# Patient Record
Sex: MALE | URBAN - METROPOLITAN AREA
[De-identification: names, ages, dates, MRNs, and addresses within clinical notes are randomized per-mention and may not be internally consistent; named-entity substitution may affect disease eponyms.]

---

## 2018-01-12 PROBLEM — I25.10 3-VESSEL CAD: Status: ACTIVE | Noted: 2018-01-12

## 2018-01-13 ENCOUNTER — HOSPITAL ENCOUNTER (INPATIENT)
Facility: HOSPITAL | Age: 83
LOS: 3 days | Discharge: NURSING, REHAB OR LONG-TERM CARE | DRG: 215 | End: 2018-01-16
Attending: THORACIC SURGERY (CARDIOTHORACIC VASCULAR SURGERY) | Admitting: THORACIC SURGERY (CARDIOTHORACIC VASCULAR SURGERY)
Payer: MEDICARE

## 2018-01-13 ENCOUNTER — ANESTHESIA EVENT (OUTPATIENT)
Dept: MEDSURG UNIT | Facility: HOSPITAL | Age: 83
End: 2018-01-13

## 2018-01-13 DIAGNOSIS — G11.9 CEREBELLAR ATAXIA: ICD-10-CM

## 2018-01-13 DIAGNOSIS — I48.91 A-FIB: ICD-10-CM

## 2018-01-13 DIAGNOSIS — E78.5 DYSLIPIDEMIA: ICD-10-CM

## 2018-01-13 DIAGNOSIS — Z95.5 S/P DRUG ELUTING CORONARY STENT PLACEMENT: ICD-10-CM

## 2018-01-13 DIAGNOSIS — E11.59 TYPE 2 DIABETES MELLITUS WITH OTHER CIRCULATORY COMPLICATION, WITHOUT LONG-TERM CURRENT USE OF INSULIN: ICD-10-CM

## 2018-01-13 DIAGNOSIS — R07.9 CHEST PAIN: ICD-10-CM

## 2018-01-13 DIAGNOSIS — I20.0 UNSTABLE ANGINA: ICD-10-CM

## 2018-01-13 DIAGNOSIS — I21.3 ACUTE ST SEGMENT ELEVATION MI: ICD-10-CM

## 2018-01-13 DIAGNOSIS — I25.10 3-VESSEL CAD: ICD-10-CM

## 2018-01-13 DIAGNOSIS — I10 ESSENTIAL HYPERTENSION: ICD-10-CM

## 2018-01-13 DIAGNOSIS — I73.9 PERIPHERAL ARTERIAL DISEASE: ICD-10-CM

## 2018-01-13 DIAGNOSIS — I21.4 NSTEMI (NON-ST ELEVATED MYOCARDIAL INFARCTION): Primary | ICD-10-CM

## 2018-01-13 PROBLEM — E11.9 TYPE 2 DIABETES MELLITUS: Status: ACTIVE | Noted: 2018-01-13

## 2018-01-13 PROBLEM — F03.90 DEMENTIA: Status: ACTIVE | Noted: 2018-01-13

## 2018-01-13 LAB
ALBUMIN SERPL BCP-MCNC: 3.2 G/DL
ALBUMIN SERPL BCP-MCNC: 4 G/DL
ALP SERPL-CCNC: 52 U/L
ALP SERPL-CCNC: 73 U/L
ALT SERPL W/O P-5'-P-CCNC: 19 U/L
ALT SERPL W/O P-5'-P-CCNC: 39 U/L
ANION GAP SERPL CALC-SCNC: 10 MMOL/L
ANION GAP SERPL CALC-SCNC: 9 MMOL/L
APTT BLDCRRT: <21 SEC
AST SERPL-CCNC: 237 U/L
AST SERPL-CCNC: 45 U/L
BASOPHILS # BLD AUTO: 0.02 K/UL
BASOPHILS # BLD AUTO: 0.03 K/UL
BASOPHILS NFR BLD: 0.1 %
BASOPHILS NFR BLD: 0.2 %
BILIRUB SERPL-MCNC: 0.6 MG/DL
BILIRUB SERPL-MCNC: 0.7 MG/DL
BILIRUB UR QL STRIP: NEGATIVE
BNP SERPL-MCNC: 310 PG/ML
BUN SERPL-MCNC: 20 MG/DL
BUN SERPL-MCNC: 20 MG/DL
CALCIUM SERPL-MCNC: 8.4 MG/DL
CALCIUM SERPL-MCNC: 9.6 MG/DL
CHLORIDE SERPL-SCNC: 104 MMOL/L
CHLORIDE SERPL-SCNC: 106 MMOL/L
CHOLEST SERPL-MCNC: 159 MG/DL
CHOLEST/HDLC SERPL: 3.2 {RATIO}
CLARITY UR REFRACT.AUTO: CLEAR
CO2 SERPL-SCNC: 24 MMOL/L
CO2 SERPL-SCNC: 25 MMOL/L
COLOR UR AUTO: YELLOW
CORONARY STENOSIS: ABNORMAL
CORONARY STENT: YES
CREAT SERPL-MCNC: 0.8 MG/DL
CREAT SERPL-MCNC: 1 MG/DL
DIFFERENTIAL METHOD: ABNORMAL
DIFFERENTIAL METHOD: ABNORMAL
EOSINOPHIL # BLD AUTO: 0 K/UL
EOSINOPHIL # BLD AUTO: 0 K/UL
EOSINOPHIL NFR BLD: 0 %
EOSINOPHIL NFR BLD: 0 %
ERYTHROCYTE [DISTWIDTH] IN BLOOD BY AUTOMATED COUNT: 12.5 %
ERYTHROCYTE [DISTWIDTH] IN BLOOD BY AUTOMATED COUNT: 12.7 %
EST. GFR  (AFRICAN AMERICAN): >60 ML/MIN/1.73 M^2
EST. GFR  (AFRICAN AMERICAN): >60 ML/MIN/1.73 M^2
EST. GFR  (NON AFRICAN AMERICAN): >60 ML/MIN/1.73 M^2
EST. GFR  (NON AFRICAN AMERICAN): >60 ML/MIN/1.73 M^2
ESTIMATED AVG GLUCOSE: 120 MG/DL
GLUCOSE SERPL-MCNC: 139 MG/DL
GLUCOSE SERPL-MCNC: 199 MG/DL
GLUCOSE UR QL STRIP: NEGATIVE
HBA1C MFR BLD HPLC: 5.8 %
HCT VFR BLD AUTO: 39.5 %
HCT VFR BLD AUTO: 43.8 %
HDLC SERPL-MCNC: 50 MG/DL
HDLC SERPL: 31.4 %
HGB BLD-MCNC: 12.7 G/DL
HGB BLD-MCNC: 14.6 G/DL
HGB UR QL STRIP: NEGATIVE
IMM GRANULOCYTES # BLD AUTO: 0.13 K/UL
IMM GRANULOCYTES # BLD AUTO: 0.15 K/UL
IMM GRANULOCYTES NFR BLD AUTO: 0.7 %
IMM GRANULOCYTES NFR BLD AUTO: 0.7 %
INR PPP: 1
KETONES UR QL STRIP: ABNORMAL
LDLC SERPL CALC-MCNC: 94.8 MG/DL
LEUKOCYTE ESTERASE UR QL STRIP: NEGATIVE
LYMPHOCYTES # BLD AUTO: 0.7 K/UL
LYMPHOCYTES # BLD AUTO: 1.9 K/UL
LYMPHOCYTES NFR BLD: 10.4 %
LYMPHOCYTES NFR BLD: 3.5 %
MAGNESIUM SERPL-MCNC: 1.8 MG/DL
MAGNESIUM SERPL-MCNC: 2.4 MG/DL
MCH RBC QN AUTO: 28.8 PG
MCH RBC QN AUTO: 29.5 PG
MCHC RBC AUTO-ENTMCNC: 32.2 G/DL
MCHC RBC AUTO-ENTMCNC: 33.3 G/DL
MCV RBC AUTO: 89 FL
MCV RBC AUTO: 90 FL
MONOCYTES # BLD AUTO: 0.6 K/UL
MONOCYTES # BLD AUTO: 1.1 K/UL
MONOCYTES NFR BLD: 2.8 %
MONOCYTES NFR BLD: 6.1 %
NEUTROPHILS # BLD AUTO: 15.4 K/UL
NEUTROPHILS # BLD AUTO: 19.2 K/UL
NEUTROPHILS NFR BLD: 82.6 %
NEUTROPHILS NFR BLD: 92.9 %
NITRITE UR QL STRIP: NEGATIVE
NONHDLC SERPL-MCNC: 109 MG/DL
NRBC BLD-RTO: 0 /100 WBC
NRBC BLD-RTO: 0 /100 WBC
PH UR STRIP: 6 [PH] (ref 5–8)
PHOSPHATE SERPL-MCNC: 3.1 MG/DL
PHOSPHATE SERPL-MCNC: 3.4 MG/DL
PLATELET # BLD AUTO: 219 K/UL
PLATELET # BLD AUTO: 225 K/UL
PMV BLD AUTO: 9.6 FL
PMV BLD AUTO: 9.8 FL
POC ACTIVATED CLOTTING TIME K: 147 SEC (ref 74–137)
POTASSIUM SERPL-SCNC: 4.1 MMOL/L
POTASSIUM SERPL-SCNC: 4.1 MMOL/L
PROT SERPL-MCNC: 6.6 G/DL
PROT SERPL-MCNC: 8 G/DL
PROT UR QL STRIP: NEGATIVE
PROTHROMBIN TIME: 10.5 SEC
RBC # BLD AUTO: 4.41 M/UL
RBC # BLD AUTO: 4.95 M/UL
SAMPLE: ABNORMAL
SODIUM SERPL-SCNC: 139 MMOL/L
SODIUM SERPL-SCNC: 139 MMOL/L
SP GR UR STRIP: 1.02 (ref 1–1.03)
TRIGL SERPL-MCNC: 71 MG/DL
TROPONIN I SERPL DL<=0.01 NG/ML-MCNC: 1.83 NG/ML
URN SPEC COLLECT METH UR: ABNORMAL
UROBILINOGEN UR STRIP-ACNC: NEGATIVE EU/DL
WBC # BLD AUTO: 18.59 K/UL
WBC # BLD AUTO: 20.7 K/UL

## 2018-01-13 PROCEDURE — 85025 COMPLETE CBC W/AUTO DIFF WBC: CPT

## 2018-01-13 PROCEDURE — 36415 COLL VENOUS BLD VENIPUNCTURE: CPT

## 2018-01-13 PROCEDURE — 84100 ASSAY OF PHOSPHORUS: CPT | Mod: 91

## 2018-01-13 PROCEDURE — 25000003 PHARM REV CODE 250

## 2018-01-13 PROCEDURE — 27000014 CATH LAB PROCEDURE

## 2018-01-13 PROCEDURE — 25000003 PHARM REV CODE 250: Performed by: INTERNAL MEDICINE

## 2018-01-13 PROCEDURE — 63600175 PHARM REV CODE 636 W HCPCS: Performed by: INTERNAL MEDICINE

## 2018-01-13 PROCEDURE — 25000003 PHARM REV CODE 250: Performed by: STUDENT IN AN ORGANIZED HEALTH CARE EDUCATION/TRAINING PROGRAM

## 2018-01-13 PROCEDURE — 92933 PRQ TRLML C ATHRC ST ANGIOP1: CPT | Mod: RC,GC,, | Performed by: INTERNAL MEDICINE

## 2018-01-13 PROCEDURE — 81003 URINALYSIS AUTO W/O SCOPE: CPT

## 2018-01-13 PROCEDURE — 4A023N7 MEASUREMENT OF CARDIAC SAMPLING AND PRESSURE, LEFT HEART, PERCUTANEOUS APPROACH: ICD-10-PCS | Performed by: INTERNAL MEDICINE

## 2018-01-13 PROCEDURE — 83880 ASSAY OF NATRIURETIC PEPTIDE: CPT

## 2018-01-13 PROCEDURE — B2111ZZ FLUOROSCOPY OF MULTIPLE CORONARY ARTERIES USING LOW OSMOLAR CONTRAST: ICD-10-PCS | Performed by: INTERNAL MEDICINE

## 2018-01-13 PROCEDURE — 83735 ASSAY OF MAGNESIUM: CPT | Mod: 91

## 2018-01-13 PROCEDURE — 63600175 PHARM REV CODE 636 W HCPCS: Performed by: STUDENT IN AN ORGANIZED HEALTH CARE EDUCATION/TRAINING PROGRAM

## 2018-01-13 PROCEDURE — 02C03Z6 EXTIRPATION OF MATTER FROM CORONARY ARTERY, ONE ARTERY, BIFURCATION, PERCUTANEOUS APPROACH: ICD-10-PCS | Performed by: INTERNAL MEDICINE

## 2018-01-13 PROCEDURE — 99152 MOD SED SAME PHYS/QHP 5/>YRS: CPT | Mod: ,,, | Performed by: INTERNAL MEDICINE

## 2018-01-13 PROCEDURE — 93458 L HRT ARTERY/VENTRICLE ANGIO: CPT | Mod: 26,59,GC, | Performed by: INTERNAL MEDICINE

## 2018-01-13 PROCEDURE — 93005 ELECTROCARDIOGRAM TRACING: CPT

## 2018-01-13 PROCEDURE — 02HA3RJ INSERTION OF SHORT-TERM EXTERNAL HEART ASSIST SYSTEM INTO HEART, INTRAOPERATIVE, PERCUTANEOUS APPROACH: ICD-10-PCS | Performed by: INTERNAL MEDICINE

## 2018-01-13 PROCEDURE — 92928 PRQ TCAT PLMT NTRAC ST 1 LES: CPT | Mod: 59,LC,GC, | Performed by: INTERNAL MEDICINE

## 2018-01-13 PROCEDURE — 85610 PROTHROMBIN TIME: CPT

## 2018-01-13 PROCEDURE — B2151ZZ FLUOROSCOPY OF LEFT HEART USING LOW OSMOLAR CONTRAST: ICD-10-PCS | Performed by: INTERNAL MEDICINE

## 2018-01-13 PROCEDURE — 92979 ENDOLUMINL IVUS OCT C EA: CPT | Mod: 26,GC,, | Performed by: INTERNAL MEDICINE

## 2018-01-13 PROCEDURE — 5A0221D ASSISTANCE WITH CARDIAC OUTPUT USING IMPELLER PUMP, CONTINUOUS: ICD-10-PCS | Performed by: INTERNAL MEDICINE

## 2018-01-13 PROCEDURE — 27000426 HC IMPELLA SET UP

## 2018-01-13 PROCEDURE — 85730 THROMBOPLASTIN TIME PARTIAL: CPT

## 2018-01-13 PROCEDURE — 84484 ASSAY OF TROPONIN QUANT: CPT

## 2018-01-13 PROCEDURE — 63600175 PHARM REV CODE 636 W HCPCS

## 2018-01-13 PROCEDURE — 83735 ASSAY OF MAGNESIUM: CPT

## 2018-01-13 PROCEDURE — B240ZZ3 ULTRASONOGRAPHY OF SINGLE CORONARY ARTERY, INTRAVASCULAR: ICD-10-PCS | Performed by: INTERNAL MEDICINE

## 2018-01-13 PROCEDURE — 27000221 HC OXYGEN, UP TO 24 HOURS

## 2018-01-13 PROCEDURE — 20000000 HC ICU ROOM

## 2018-01-13 PROCEDURE — 27000203 HC IMPELLA ADD'L DAY (CL)

## 2018-01-13 PROCEDURE — 92978 ENDOLUMINL IVUS OCT C 1ST: CPT

## 2018-01-13 PROCEDURE — 99233 SBSQ HOSP IP/OBS HIGH 50: CPT | Mod: GC,,, | Performed by: INTERNAL MEDICINE

## 2018-01-13 PROCEDURE — 80061 LIPID PANEL: CPT

## 2018-01-13 PROCEDURE — 80053 COMPREHEN METABOLIC PANEL: CPT

## 2018-01-13 PROCEDURE — 93010 ELECTROCARDIOGRAM REPORT: CPT | Mod: ,,, | Performed by: INTERNAL MEDICINE

## 2018-01-13 PROCEDURE — 63600175 PHARM REV CODE 636 W HCPCS: Performed by: THORACIC SURGERY (CARDIOTHORACIC VASCULAR SURGERY)

## 2018-01-13 PROCEDURE — 92978 ENDOLUMINL IVUS OCT C 1ST: CPT | Mod: 26,GC,, | Performed by: INTERNAL MEDICINE

## 2018-01-13 PROCEDURE — 25000003 PHARM REV CODE 250: Performed by: THORACIC SURGERY (CARDIOTHORACIC VASCULAR SURGERY)

## 2018-01-13 PROCEDURE — 5A1213Z PERFORMANCE OF CARDIAC PACING, INTERMITTENT: ICD-10-PCS | Performed by: INTERNAL MEDICINE

## 2018-01-13 PROCEDURE — 83036 HEMOGLOBIN GLYCOSYLATED A1C: CPT

## 2018-01-13 PROCEDURE — 80053 COMPREHEN METABOLIC PANEL: CPT | Mod: 91

## 2018-01-13 PROCEDURE — 33990 INSJ PERQ VAD L HRT ARTERIAL: CPT | Mod: GC,,, | Performed by: INTERNAL MEDICINE

## 2018-01-13 PROCEDURE — 0273376 DILATION OF CORONARY ARTERY, FOUR OR MORE ARTERIES, BIFURCATION, WITH FOUR OR MORE DRUG-ELUTING INTRALUMINAL DEVICES, PERCUTANEOUS APPROACH: ICD-10-PCS | Performed by: INTERNAL MEDICINE

## 2018-01-13 PROCEDURE — 84100 ASSAY OF PHOSPHORUS: CPT

## 2018-01-13 RX ORDER — FAMOTIDINE 10 MG/ML
20 INJECTION INTRAVENOUS EVERY 12 HOURS
Status: DISCONTINUED | OUTPATIENT
Start: 2018-01-13 | End: 2018-01-13

## 2018-01-13 RX ORDER — LUBIPROSTONE 24 UG/1
24 CAPSULE ORAL 2 TIMES DAILY WITH MEALS
COMMUNITY

## 2018-01-13 RX ORDER — ROSUVASTATIN CALCIUM 5 MG/1
10 TABLET, COATED ORAL NIGHTLY
Status: DISCONTINUED | OUTPATIENT
Start: 2018-01-13 | End: 2018-01-13

## 2018-01-13 RX ORDER — ROSUVASTATIN CALCIUM 10 MG/1
10 TABLET, COATED ORAL DAILY
COMMUNITY

## 2018-01-13 RX ORDER — LISINOPRIL 20 MG/1
20 TABLET ORAL DAILY
COMMUNITY

## 2018-01-13 RX ORDER — CARVEDILOL 3.12 MG/1
3.12 TABLET ORAL 2 TIMES DAILY WITH MEALS
Status: ON HOLD | COMMUNITY
End: 2018-01-16 | Stop reason: HOSPADM

## 2018-01-13 RX ORDER — MORPHINE SULFATE 2 MG/ML
2 INJECTION, SOLUTION INTRAMUSCULAR; INTRAVENOUS ONCE
Status: DISCONTINUED | OUTPATIENT
Start: 2018-01-13 | End: 2018-01-13

## 2018-01-13 RX ORDER — SODIUM CHLORIDE 9 MG/ML
INJECTION, SOLUTION INTRAVENOUS CONTINUOUS
Status: ACTIVE | OUTPATIENT
Start: 2018-01-13 | End: 2018-01-14

## 2018-01-13 RX ORDER — DRONABINOL 2.5 MG/1
2.5 CAPSULE ORAL 2 TIMES DAILY
Status: DISCONTINUED | OUTPATIENT
Start: 2018-01-13 | End: 2018-01-16 | Stop reason: HOSPADM

## 2018-01-13 RX ORDER — ISOSORBIDE MONONITRATE 30 MG/1
30 TABLET, EXTENDED RELEASE ORAL DAILY
Status: ON HOLD | COMMUNITY
End: 2018-01-16 | Stop reason: HOSPADM

## 2018-01-13 RX ORDER — ASPIRIN 81 MG/1
81 TABLET ORAL DAILY
Status: DISCONTINUED | OUTPATIENT
Start: 2018-01-13 | End: 2018-01-16 | Stop reason: HOSPADM

## 2018-01-13 RX ORDER — SODIUM CHLORIDE 0.9 % (FLUSH) 0.9 %
3 SYRINGE (ML) INJECTION
Status: DISCONTINUED | OUTPATIENT
Start: 2018-01-13 | End: 2018-01-16 | Stop reason: HOSPADM

## 2018-01-13 RX ORDER — NITROGLYCERIN 0.4 MG/1
0.4 TABLET SUBLINGUAL EVERY 5 MIN PRN
Status: DISCONTINUED | OUTPATIENT
Start: 2018-01-13 | End: 2018-01-16 | Stop reason: HOSPADM

## 2018-01-13 RX ORDER — SODIUM CHLORIDE 9 MG/ML
INJECTION, SOLUTION INTRAVENOUS CONTINUOUS
Status: CANCELLED | OUTPATIENT
Start: 2018-01-13

## 2018-01-13 RX ORDER — GLIMEPIRIDE 2 MG/1
2 TABLET ORAL
COMMUNITY

## 2018-01-13 RX ORDER — ONDANSETRON 2 MG/ML
4 INJECTION INTRAMUSCULAR; INTRAVENOUS EVERY 6 HOURS PRN
Status: DISCONTINUED | OUTPATIENT
Start: 2018-01-13 | End: 2018-01-16 | Stop reason: HOSPADM

## 2018-01-13 RX ORDER — ONDANSETRON 2 MG/ML
INJECTION INTRAMUSCULAR; INTRAVENOUS
Status: COMPLETED
Start: 2018-01-13 | End: 2018-01-13

## 2018-01-13 RX ORDER — HYDRALAZINE HYDROCHLORIDE 20 MG/ML
10 INJECTION INTRAMUSCULAR; INTRAVENOUS EVERY 4 HOURS PRN
Status: DISCONTINUED | OUTPATIENT
Start: 2018-01-13 | End: 2018-01-16 | Stop reason: HOSPADM

## 2018-01-13 RX ORDER — MORPHINE SULFATE 2 MG/ML
2 INJECTION, SOLUTION INTRAMUSCULAR; INTRAVENOUS EVERY 4 HOURS PRN
Status: DISCONTINUED | OUTPATIENT
Start: 2018-01-13 | End: 2018-01-16 | Stop reason: HOSPADM

## 2018-01-13 RX ORDER — HYDRALAZINE HYDROCHLORIDE 20 MG/ML
10 INJECTION INTRAMUSCULAR; INTRAVENOUS ONCE
Status: COMPLETED | OUTPATIENT
Start: 2018-01-13 | End: 2018-01-13

## 2018-01-13 RX ORDER — ASPIRIN 81 MG/1
81 TABLET ORAL DAILY
COMMUNITY

## 2018-01-13 RX ORDER — PYRIDOXINE HCL (VITAMIN B6) 100 MG
100 TABLET ORAL DAILY
COMMUNITY

## 2018-01-13 RX ORDER — NITROGLYCERIN 20 MG/100ML
10 INJECTION INTRAVENOUS CONTINUOUS
Status: DISCONTINUED | OUTPATIENT
Start: 2018-01-13 | End: 2018-01-14

## 2018-01-13 RX ORDER — DRONABINOL 2.5 MG/1
2.5 CAPSULE ORAL
Status: ON HOLD | COMMUNITY
End: 2018-01-16

## 2018-01-13 RX ORDER — METOPROLOL TARTRATE 1 MG/ML
5 INJECTION, SOLUTION INTRAVENOUS ONCE
Status: COMPLETED | OUTPATIENT
Start: 2018-01-13 | End: 2018-01-13

## 2018-01-13 RX ORDER — AMLODIPINE BESYLATE 5 MG/1
5 TABLET ORAL DAILY
Status: ON HOLD | COMMUNITY
End: 2018-01-16 | Stop reason: HOSPADM

## 2018-01-13 RX ORDER — NITROGLYCERIN 0.4 MG/1
0.4 TABLET SUBLINGUAL EVERY 5 MIN PRN
COMMUNITY

## 2018-01-13 RX ORDER — ROSUVASTATIN CALCIUM 20 MG/1
20 TABLET, COATED ORAL NIGHTLY
Status: DISCONTINUED | OUTPATIENT
Start: 2018-01-13 | End: 2018-01-16 | Stop reason: HOSPADM

## 2018-01-13 RX ORDER — GLUCAGON 1 MG
1 KIT INJECTION
Status: DISCONTINUED | OUTPATIENT
Start: 2018-01-13 | End: 2018-01-16 | Stop reason: HOSPADM

## 2018-01-13 RX ORDER — POLYETHYLENE GLYCOL 3350 17 G/17G
17 POWDER, FOR SOLUTION ORAL DAILY
Status: DISCONTINUED | OUTPATIENT
Start: 2018-01-13 | End: 2018-01-16 | Stop reason: HOSPADM

## 2018-01-13 RX ORDER — METOPROLOL TARTRATE 1 MG/ML
INJECTION, SOLUTION INTRAVENOUS
Status: COMPLETED
Start: 2018-01-13 | End: 2018-01-13

## 2018-01-13 RX ORDER — PANTOPRAZOLE SODIUM 40 MG/1
40 TABLET, DELAYED RELEASE ORAL DAILY
COMMUNITY

## 2018-01-13 RX ORDER — ACETAMINOPHEN 325 MG/1
650 TABLET ORAL EVERY 4 HOURS PRN
Status: DISCONTINUED | OUTPATIENT
Start: 2018-01-13 | End: 2018-01-16 | Stop reason: HOSPADM

## 2018-01-13 RX ORDER — METHYLPREDNISOLONE SOD SUCC 125 MG
125 VIAL (EA) INJECTION EVERY 6 HOURS
Status: DISCONTINUED | OUTPATIENT
Start: 2018-01-13 | End: 2018-01-13

## 2018-01-13 RX ORDER — ONDANSETRON 4 MG/1
4 TABLET, ORALLY DISINTEGRATING ORAL EVERY 6 HOURS PRN
Status: DISCONTINUED | OUTPATIENT
Start: 2018-01-13 | End: 2018-01-16 | Stop reason: HOSPADM

## 2018-01-13 RX ORDER — CARVEDILOL 3.12 MG/1
3.12 TABLET ORAL 2 TIMES DAILY
Status: DISCONTINUED | OUTPATIENT
Start: 2018-01-13 | End: 2018-01-14

## 2018-01-13 RX ORDER — LISINOPRIL 20 MG/1
20 TABLET ORAL DAILY
Status: DISCONTINUED | OUTPATIENT
Start: 2018-01-13 | End: 2018-01-16 | Stop reason: HOSPADM

## 2018-01-13 RX ORDER — DIPHENHYDRAMINE HYDROCHLORIDE 50 MG/ML
50 INJECTION INTRAMUSCULAR; INTRAVENOUS ONCE
Status: COMPLETED | OUTPATIENT
Start: 2018-01-13 | End: 2018-01-13

## 2018-01-13 RX ORDER — NITROGLYCERIN 0.4 MG/1
TABLET SUBLINGUAL
Status: DISPENSED
Start: 2018-01-13 | End: 2018-01-13

## 2018-01-13 RX ORDER — PNV NO.95/FERROUS FUM/FOLIC AC 28MG-0.8MG
1000 TABLET ORAL DAILY
COMMUNITY

## 2018-01-13 RX ORDER — INSULIN ASPART 100 [IU]/ML
1-10 INJECTION, SOLUTION INTRAVENOUS; SUBCUTANEOUS EVERY 6 HOURS PRN
Status: DISCONTINUED | OUTPATIENT
Start: 2018-01-13 | End: 2018-01-16 | Stop reason: HOSPADM

## 2018-01-13 RX ORDER — AMLODIPINE BESYLATE 5 MG/1
5 TABLET ORAL DAILY
Status: DISCONTINUED | OUTPATIENT
Start: 2018-01-14 | End: 2018-01-15

## 2018-01-13 RX ADMIN — POLYETHYLENE GLYCOL 3350 17 G: 17 POWDER, FOR SOLUTION ORAL at 10:01

## 2018-01-13 RX ADMIN — CARVEDILOL 3.12 MG: 3.12 TABLET, FILM COATED ORAL at 11:01

## 2018-01-13 RX ADMIN — LISINOPRIL 20 MG: 20 TABLET ORAL at 11:01

## 2018-01-13 RX ADMIN — DRONABINOL 2.5 MG: 2.5 CAPSULE ORAL at 11:01

## 2018-01-13 RX ADMIN — ONDANSETRON 4 MG: 2 INJECTION INTRAMUSCULAR; INTRAVENOUS at 10:01

## 2018-01-13 RX ADMIN — ASPIRIN 81 MG: 81 TABLET, COATED ORAL at 09:01

## 2018-01-13 RX ADMIN — ONDANSETRON 4 MG: 4 TABLET, ORALLY DISINTEGRATING ORAL at 09:01

## 2018-01-13 RX ADMIN — SODIUM CHLORIDE 300 ML/HR: 0.9 INJECTION, SOLUTION INTRAVENOUS at 08:01

## 2018-01-13 RX ADMIN — MORPHINE SULFATE 2 MG: 2 INJECTION, SOLUTION INTRAMUSCULAR; INTRAVENOUS at 10:01

## 2018-01-13 RX ADMIN — NITROGLYCERIN 0.4 MG: 0.4 TABLET SUBLINGUAL at 10:01

## 2018-01-13 RX ADMIN — TICAGRELOR 180 MG: 90 TABLET ORAL at 10:01

## 2018-01-13 RX ADMIN — ONDANSETRON 4 MG: 2 INJECTION INTRAMUSCULAR; INTRAVENOUS at 12:01

## 2018-01-13 RX ADMIN — NITROGLYCERIN 10 MCG/MIN: 20 INJECTION INTRAVENOUS at 11:01

## 2018-01-13 RX ADMIN — DIPHENHYDRAMINE HYDROCHLORIDE 25 MG: 50 INJECTION, SOLUTION INTRAMUSCULAR; INTRAVENOUS at 12:01

## 2018-01-13 RX ADMIN — ROSUVASTATIN CALCIUM 20 MG: 20 TABLET, FILM COATED ORAL at 11:01

## 2018-01-13 RX ADMIN — METOPROLOL TARTRATE 5 MG: 1 INJECTION, SOLUTION INTRAVENOUS at 12:01

## 2018-01-13 RX ADMIN — METHYLPREDNISOLONE SODIUM SUCCINATE 125 MG: 125 INJECTION, POWDER, FOR SOLUTION INTRAMUSCULAR; INTRAVENOUS at 01:01

## 2018-01-13 RX ADMIN — METOPROLOL TARTRATE 5 MG: 5 INJECTION, SOLUTION INTRAVENOUS at 12:01

## 2018-01-13 RX ADMIN — DRONABINOL 2.5 MG: 2.5 CAPSULE ORAL at 10:01

## 2018-01-13 RX ADMIN — HYDRALAZINE HYDROCHLORIDE 10 MG: 20 INJECTION INTRAMUSCULAR; INTRAVENOUS at 09:01

## 2018-01-13 NOTE — HPI
Pt is a 83 y/o male w hx of mild dementia, hypertension, hyperlipidemia, severe three vessel CAD with prior PCTA in [place], HTN, HLD, T2DM, BPH, carotid artery Stenosis, and TIA who presented to OSH with unstable angina despite beta blocker, nitrate, and 4 baby aspirin. States CP lasted 10-15 min, radiated down arm. SOB week prior to episode. Woke up patient from sleep. While there had a LHC with  distal LMCA 90%, ostial LAD 90%, ostial LCx 90%, mid RCA 95%, and EF 55%. He was initially on nitro gtt there but on transfer that was stopped and he was on the floor here. Today he had chest pain that was refractory to sublingual nitro so cardiology was consulted. On evaluation of the patient EKG with diffuse ST depressions in inferior, anterior, and lateral leads. He was transferred to the ICU and started on nitroglycerin gtt. Which helped a bit with the chest pain but did not relieve it all the way.     Dx with stable angina 2 weeks prior, started on beta- blockade. Prior LHC in 2002 showed severe diffuse CAD with  of RCA collateralized and preserved LV fxn. Patient interactive, issues with short term memory. Able to care perform ADL's.      Echo 1/12/17: disc did not work but states EF is 75%     Functionally: shuffling walk, able to walk 1/2 mile per daughter with a cane

## 2018-01-13 NOTE — SUBJECTIVE & OBJECTIVE
No current facility-administered medications on file prior to encounter.      No current outpatient prescriptions on file prior to encounter.       Review of patient's allergies indicates:   Allergen Reactions    Iodine and iodide containing products Anaphylaxis     Past Medical History:   Diagnosis Date    3-vessel CAD     Alzheimer disease     Ataxia     BPH (benign prostatic hyperplasia)     Carotid stenosis     Cerebellar ataxia     Chronic nausea     HLD (hyperlipidemia)     HTN (hypertension)     TIA (transient ischemic attack)     Type 2 diabetes mellitus      Past Surgical History:   Procedure Laterality Date    APPENDECTOMY      CARDIAC CATHETERIZATION      malignant melanoma        Family History     None        Social History Main Topics    Smoking status: Former Smoker    Smokeless tobacco: Not on file    Alcohol use Yes      Comment: wine    Drug use: No    Sexual activity: Not on file     Review of Systems   Constitutional: Negative for activity change.   HENT: Negative for congestion.    Respiratory: Negative for cough, shortness of breath and wheezing.    Cardiovascular: Negative for chest pain and palpitations.   Gastrointestinal: Negative for abdominal distention and abdominal pain.   Musculoskeletal: Positive for arthralgias.   All other systems reviewed and are negative.    Objective:     Vital Signs (Most Recent):  Temp: 98.2 °F (36.8 °C) (01/13/18 0830)  Pulse: 63 (01/13/18 0700)  Resp: 18 (01/13/18 0830)  BP: (!) 174/70 (01/13/18 0830)  SpO2: (!) 91 % (01/13/18 0830) Vital Signs (24h Range):  Temp:  [98.2 °F (36.8 °C)-98.4 °F (36.9 °C)] 98.2 °F (36.8 °C)  Pulse:  [63-74] 63  Resp:  [18-20] 18  SpO2:  [91 %-94 %] 91 %  BP: (172-174)/(70-77) 174/70     Weight: 96.8 kg (213 lb 8 oz)  Body mass index is 28.17 kg/m².    SpO2: (!) 91 %  O2 Device (Oxygen Therapy): room air     Intake/Output - Last 3 Shifts     None           Lines/Drains/Airways          No matching active lines,  drains, or airways        STS Risk Score:       Physical Exam   Constitutional: He is oriented to person, place, and time. He appears well-developed and well-nourished. No distress.   HENT:   Head: Normocephalic and atraumatic.   Eyes: No scleral icterus.   Cardiovascular: Normal rate and regular rhythm.    hypertensive   Pulmonary/Chest: Effort normal. No stridor. No respiratory distress.   Abdominal: Soft. He exhibits no distension. There is no tenderness.   Musculoskeletal: He exhibits no edema.   Short, shuffling gait   Neurological: He is alert and oriented to person, place, and time.   Issues with short term memory   Nursing note and vitals reviewed.    Significant Labs:  Cardiac markers: No results for input(s): CKMB, CPKMB, TROPONINT, TROPONINI, MYOGLOBIN in the last 48 hours.  CBC: No results for input(s): WBC, RBC, HGB, HCT, PLT, MCV, MCH, MCHC in the last 48 hours.  CMP: No results for input(s): GLU, CALCIUM, ALBUMIN, PROT, NA, K, CO2, CL, BUN, CREATININE, ALKPHOS, ALT, AST, BILITOT in the last 48 hours.  Coagulation: No results for input(s): PT, INR, APTT in the last 48 hours.  Lactic Acid: No results for input(s): LACTATE in the last 48 hours.  All pertinent labs from the last 24 hours have been reviewed.    Significant Diagnostics:  I have reviewed and interpreted all pertinent imaging results/findings within the past 24 hours.

## 2018-01-13 NOTE — ASSESSMENT & PLAN NOTE
81 y/o male with multiple comorbidities including dementia presents for evaluation of urgent CABG for 3 vessel CAD, admitted to OSH with unstable angina. Stable now without CP.     - admit to CTS, orders placed  - home meds started  - review remaining home meds   - will discuss and review imaging with Dr. Mercedes

## 2018-01-13 NOTE — PROGRESS NOTES
Pt arrived to ICU91 from CSU per RN, on 3LNC, connected to bedside tele monitor. Dr. Miller and Dr. Hannah aware of arrival. All VSS. New orders received and carried out. See flowsheet for full assessment. Will continue to monitor.

## 2018-01-13 NOTE — SUBJECTIVE & OBJECTIVE
Past Medical History:   Diagnosis Date    3-vessel CAD     Alzheimer disease     Ataxia     BPH (benign prostatic hyperplasia)     Carotid stenosis     Cerebellar ataxia     Chronic nausea     HLD (hyperlipidemia)     HTN (hypertension)     TIA (transient ischemic attack)     Type 2 diabetes mellitus        Past Surgical History:   Procedure Laterality Date    APPENDECTOMY      CARDIAC CATHETERIZATION      malignant melanoma          Review of patient's allergies indicates:   Allergen Reactions    Iodine and iodide containing products Anaphylaxis       No current facility-administered medications on file prior to encounter.      No current outpatient prescriptions on file prior to encounter.     Family History     None        Social History Main Topics    Smoking status: Former Smoker    Smokeless tobacco: Not on file    Alcohol use Yes      Comment: wine    Drug use: No    Sexual activity: Not on file     Review of Systems   All other systems reviewed and are negative.    Objective:     Vital Signs (Most Recent):  Temp: 98.6 °F (37 °C) (01/13/18 1200)  Pulse: (!) 59 (01/13/18 1245)  Resp: (!) 21 (01/13/18 1245)  BP: 116/62 (01/13/18 1245)  SpO2: 98 % (01/13/18 1245) Vital Signs (24h Range):  Temp:  [98.2 °F (36.8 °C)-98.6 °F (37 °C)] 98.6 °F (37 °C)  Pulse:  [58-97] 59  Resp:  [12-25] 21  SpO2:  [91 %-99 %] 98 %  BP: (112-174)/(57-82) 116/62     Weight: 96.8 kg (213 lb 8 oz)  Body mass index is 28.17 kg/m².    SpO2: 98 %  O2 Device (Oxygen Therapy): room air      Intake/Output Summary (Last 24 hours) at 01/13/18 1316  Last data filed at 01/13/18 1200   Gross per 24 hour   Intake                0 ml   Output              375 ml   Net             -375 ml       Lines/Drains/Airways     Drain                 Suprapubic Catheter 01/13/13 1826 days                Physical Exam   Constitutional: He appears well-developed and well-nourished.   HENT:   Head: Normocephalic and atraumatic.   Neck: Normal  range of motion. Neck supple. No JVD present.   Cardiovascular: Normal rate, regular rhythm and normal heart sounds.  Exam reveals no gallop and no friction rub.    No murmur heard.  Pulses:       Femoral pulses are 2+ on the right side, and 2+ on the left side.  Pulmonary/Chest: Effort normal and breath sounds normal. He has no rales.   Abdominal: Soft. Bowel sounds are normal.   Musculoskeletal: Normal range of motion. He exhibits no edema.   Neurological: He is alert.   Skin: Skin is warm and dry.       Significant Labs:   Recent Lab Results       01/13/18  0915      Immature Granulocytes 0.7(H)     Immature Grans (Abs) 0.13(H)     Albumin 4.0     Alkaline Phosphatase 73     ALT 19     Anion Gap 10     aPTT <21.0  Comment:  aPTT therapeutic range = 39-69 seconds     AST 45(H)     Baso # 0.03     Basophil% 0.2     Total Bilirubin 0.6  Comment:  For infants and newborns, interpretation of results should be based  on gestational age, weight and in agreement with clinical  observations.  Premature Infant recommended reference ranges:  Up to 24 hours.............<8.0 mg/dL  Up to 48 hours............<12.0 mg/dL  3-5 days..................<15.0 mg/dL  6-29 days.................<15.0 mg/dL       BUN, Bld 20     Calcium 9.6     Chloride 104     CO2 25     Creatinine 1.0     Differential Method Automated     eGFR if African American >60.0     eGFR if non  >60.0  Comment:  Calculation used to obtain the estimated glomerular filtration  rate (eGFR) is the CKD-EPI equation.        Eos # 0.0     Eosinophil% 0.0     Glucose 139(H)     Gran # 15.4(H)     Gran% 82.6(H)     Hematocrit 43.8     Hemoglobin 14.6     Coumadin Monitoring INR 1.0  Comment:  Coumadin Therapy:  2.0 - 3.0 for INR for all indicators except mechanical heart valves  and antiphospholipid syndromes which should use 2.5 - 3.5.       Lymph # 1.9     Lymph% 10.4(L)     Magnesium 2.4     MCH 29.5     MCHC 33.3     MCV 89     Mono # 1.1(H)     Mono%  6.1     MPV 9.8     nRBC 0     Phosphorus 3.1     Platelets 219     Potassium 4.1     Total Protein 8.0     Protime 10.5     RBC 4.95     RDW 12.5     Sodium 139     WBC 18.59(H)           Significant Imaging: EKG: Diffuse ST depressions in inf, ant, lat leads

## 2018-01-13 NOTE — PROGRESS NOTES
Dr. Brown informed pt only able to tolerate 25mg of IVP benadryl, started to mildly hallucinate. Will continue to monitor.

## 2018-01-13 NOTE — HPI
83 y/o male c pmhc of severe three vessel CAD with prior PCA in [place], HTN, HLD, T2DM, BPH, alzheimer's dementia, carotid aa. Stenosis, and TIA who presented to OSH with unstable angina despite beta blocker, nitrate, and 4 baby aspirin.     States CP lasted 10-15 min, radiated down arm. SOB week prior to episode. Woke up patient from sleep.   Dx with stable angina 2 weeks prior, started on beta- blockade. Prior LHC in 2002 showed severe diffuse CAD with  of RCA collateralized and preserved LV fxn. Patient interactive, issues with short term memory. Able to care perform ADL's.     LHC at [facility] 1/11/18 showed:   - distal LMCA 90%   - ostial LAD 90%  - ostial LCx 90%  - mid RCA 95%  - EF 55%    Echo 1/12/17: disc, not report     Functionally: shuffling walk, able to walk 1/2 mile per daughter with a cane

## 2018-01-13 NOTE — HPI
Chief Complaint/Reason for Admission: unstable angina     History of Present Illness: 81 y/o male c pmhc of severe three vessel CAD with prior PCA in [place], HTN, HLD, T2DM, BPH, alzheimer's dementia, carotid aa. Stenosis, and TIA who presented to OSH with unstable angina despite beta blocker, nitrate, and 4 baby aspirin.     Initially Dx with stable angina 2 weeks prior, started on beta- blockade. Prior LHC in 2002 showed severe diffuse CAD with  of RCA collateralized and preserved LV fxn. Patient interactive, issues with short term memory. Able to care perform ADL's.

## 2018-01-13 NOTE — CONSULTS
[facility]  Cardiology  Consult Note    Patient Name: Flo De La Torre  MRN: [id number]  Admission Date: 1/13/2018  Hospital Length of Stay: 0 days  Code Status: Full Code   Attending Provider: Flo Mercedes MD   Consulting Provider: Luis Miller MD  Primary Care Physician: Primary Doctor No  Principal Problem:3-vessel CAD    Patient information was obtained from notes sent, family and patient and ER records.     Inpatient consult to Cardiology  Consult performed by: LUIS MILLER  Consult ordered by: JOSIANE SANDOVAL  Reason for consult: unstable angina        Subjective:     Chief Complaint:  Chest pain    HPI:   Pt is a 83 y/o male w hx of mild dementia, hypertension, hyperlipidemia, severe three vessel CAD with prior PCTA in [place], HTN, HLD, T2DM, BPH, carotid artery Stenosis, and TIA who presented to OSH with unstable angina despite beta blocker, nitrate, and 4 baby aspirin. States CP lasted 10-15 min, radiated down arm. SOB week prior to episode. Woke up patient from sleep. While there had a LHC with  distal LMCA 90%, ostial LAD 90%, ostial LCx 90%, mid RCA 95%, and EF 55%. He was initially on nitro gtt there but on transfer that was stopped and he was on the floor here. Today he had chest pain that was refractory to sublingual nitro so cardiology was consulted. On evaluation of the patient EKG with diffuse ST depressions in inferior, anterior, and lateral leads. He was transferred to the ICU and started on nitroglycerin gtt. Which helped a bit with the chest pain but did not relieve it all the way.     Dx with stable angina 2 weeks prior, started on beta- blockade. Prior LHC in 2002 showed severe diffuse CAD with  of RCA collateralized and preserved LV fxn. Patient interactive, issues with short term memory. Able to care perform ADL's.      Echo 1/12/17: disc did not work but states EF is 75%     Functionally: shuffling walk, able to walk 1/2 mile per daughter with a  cane    Past Medical History:   Diagnosis Date    3-vessel CAD     Alzheimer disease     Ataxia     BPH (benign prostatic hyperplasia)     Carotid stenosis     Cerebellar ataxia     Chronic nausea     HLD (hyperlipidemia)     HTN (hypertension)     TIA (transient ischemic attack)     Type 2 diabetes mellitus        Past Surgical History:   Procedure Laterality Date    APPENDECTOMY      CARDIAC CATHETERIZATION      malignant melanoma          Review of patient's allergies indicates:   Allergen Reactions    Iodine and iodide containing products Anaphylaxis       No current facility-administered medications on file prior to encounter.      No current outpatient prescriptions on file prior to encounter.     Family History     None        Social History Main Topics    Smoking status: Former Smoker    Smokeless tobacco: Not on file    Alcohol use Yes      Comment: wine    Drug use: No    Sexual activity: Not on file     Review of Systems   All other systems reviewed and are negative.    Objective:     Vital Signs (Most Recent):  Temp: 98.6 °F (37 °C) (01/13/18 1200)  Pulse: (!) 59 (01/13/18 1245)  Resp: (!) 21 (01/13/18 1245)  BP: 116/62 (01/13/18 1245)  SpO2: 98 % (01/13/18 1245) Vital Signs (24h Range):  Temp:  [98.2 °F (36.8 °C)-98.6 °F (37 °C)] 98.6 °F (37 °C)  Pulse:  [58-97] 59  Resp:  [12-25] 21  SpO2:  [91 %-99 %] 98 %  BP: (112-174)/(57-82) 116/62     Weight: 96.8 kg (213 lb 8 oz)  Body mass index is 28.17 kg/m².    SpO2: 98 %  O2 Device (Oxygen Therapy): room air      Intake/Output Summary (Last 24 hours) at 01/13/18 1316  Last data filed at 01/13/18 1200   Gross per 24 hour   Intake                0 ml   Output              375 ml   Net             -375 ml       Lines/Drains/Airways     Drain                 Suprapubic Catheter 01/13/13 1826 days                Physical Exam   Constitutional: He appears well-developed and well-nourished.   HENT:   Head: Normocephalic and atraumatic.   Neck:  Normal range of motion. Neck supple. No JVD present.   Cardiovascular: Normal rate, regular rhythm and normal heart sounds.  Exam reveals no gallop and no friction rub.    No murmur heard.  Pulses:       Femoral pulses are 2+ on the right side, and 2+ on the left side.  Pulmonary/Chest: Effort normal and breath sounds normal. He has no rales.   Abdominal: Soft. Bowel sounds are normal.   Musculoskeletal: Normal range of motion. He exhibits no edema.   Neurological: He is alert.   Skin: Skin is warm and dry.       Significant Labs:   Recent Lab Results       01/13/18  0915      Immature Granulocytes 0.7(H)     Immature Grans (Abs) 0.13(H)     Albumin 4.0     Alkaline Phosphatase 73     ALT 19     Anion Gap 10     aPTT <21.0  Comment:  aPTT therapeutic range = 39-69 seconds     AST 45(H)     Baso # 0.03     Basophil% 0.2     Total Bilirubin 0.6  Comment:  For infants and newborns, interpretation of results should be based  on gestational age, weight and in agreement with clinical  observations.  Premature Infant recommended reference ranges:  Up to 24 hours.............<8.0 mg/dL  Up to 48 hours............<12.0 mg/dL  3-5 days..................<15.0 mg/dL  6-29 days.................<15.0 mg/dL       BUN, Bld 20     Calcium 9.6     Chloride 104     CO2 25     Creatinine 1.0     Differential Method Automated     eGFR if African American >60.0     eGFR if non  >60.0  Comment:  Calculation used to obtain the estimated glomerular filtration  rate (eGFR) is the CKD-EPI equation.        Eos # 0.0     Eosinophil% 0.0     Glucose 139(H)     Gran # 15.4(H)     Gran% 82.6(H)     Hematocrit 43.8     Hemoglobin 14.6     Coumadin Monitoring INR 1.0  Comment:  Coumadin Therapy:  2.0 - 3.0 for INR for all indicators except mechanical heart valves  and antiphospholipid syndromes which should use 2.5 - 3.5.       Lymph # 1.9     Lymph% 10.4(L)     Magnesium 2.4     MCH 29.5     MCHC 33.3     MCV 89     Mono # 1.1(H)      Mono% 6.1     MPV 9.8     nRBC 0     Phosphorus 3.1     Platelets 219     Potassium 4.1     Total Protein 8.0     Protime 10.5     RBC 4.95     RDW 12.5     Sodium 139     WBC 18.59(H)           Significant Imaging: EKG: Diffuse ST depressions in inf, ant, lat leads    Assessment and Plan:     Unstable angina    Pt is a 83 y/o male w hx of mild dementia, hypertension, hyperlipidemia, severe three vessel CAD with prior PCTA in [place], HTN, HLD, T2DM, BPH, carotid artery Stenosis, and TIA who presented to OSH with unstable angina despite beta blocker, nitrate, and 4 baby aspirin. States CP lasted 10-15 min, radiated down arm. SOB week prior to episode. Woke up patient from sleep. While there had a LHC with  distal LMCA 90%, ostial LAD 90%, ostial LCx 90%, mid RCA 95%, and EF 55%. He was initially on nitro gtt there but on transfer that was stopped and he was on the floor here. Today he had chest pain that was refractory to sublingual nitro so cardiology was consulted. On evaluation of the patient EKG with diffuse ST depressions in inferior, anterior, and lateral leads. He was transferred to the ICU and started on nitroglycerin gtt. Which helped a bit with the chest pain but did not relieve it all the way. Case discussed with CTS who will not offer bypass surgery.     Will plan for high risk PCI today with rotablator  Right CFA access and likely Left CFA access for impella support  Loaded with Brilinta   Solumedrol given for contrast allergy  Risks, benefits and alternatives have been explained to the patient and family who understand and agree with the procedure.             VTE Risk Mitigation         Ordered     Low Risk of VTE  Once      01/13/18 0816          Thank you for your consult. I will follow-up with patient. Please contact us if you have any additional questions.    Gaetano Miller MD  Cardiology   [facility]

## 2018-01-13 NOTE — SUBJECTIVE & OBJECTIVE
Past Medical History:   Diagnosis Date    3-vessel CAD     Alzheimer disease     Ataxia     BPH (benign prostatic hyperplasia)     Carotid stenosis     Cerebellar ataxia     Chronic nausea     HLD (hyperlipidemia)     HTN (hypertension)     TIA (transient ischemic attack)     Type 2 diabetes mellitus        Past Surgical History:   Procedure Laterality Date    APPENDECTOMY      CARDIAC CATHETERIZATION      malignant melanoma          Review of patient's allergies indicates:   Allergen Reactions    Iodine and iodide containing products Anaphylaxis       PTA Medications   Medication Sig    amLODIPine (NORVASC) 5 MG tablet Take 5 mg by mouth once daily.    aspirin (ECOTRIN) 81 MG EC tablet Take 81 mg by mouth once daily.    carvedilol (COREG) 3.125 MG tablet Take 3.125 mg by mouth 2 (two) times daily with meals.    cyanocobalamin (VITAMIN B-12) 100 MCG tablet Take 1,000 mcg by mouth once daily.    dronabinol (MARINOL) 2.5 MG capsule Take 2.5 mg by mouth 2 (two) times daily before meals.    glimepiride (AMARYL) 2 MG tablet Take 2 mg by mouth before breakfast.    isosorbide mononitrate (IMDUR) 30 MG 24 hr tablet Take 30 mg by mouth once daily.    lisinopril (PRINIVIL,ZESTRIL) 20 MG tablet Take 20 mg by mouth once daily.    lubiprostone (AMITIZA) 24 MCG Cap Take 24 mcg by mouth 2 (two) times daily with meals.    MULTIVIT-IRON-MIN-FOLIC ACID 3,500-18-0.4 UNIT-MG-MG ORAL CHEW Take by mouth.    multivitamin (THERAGRAN) tablet Take 1 tablet by mouth once daily.    nitroGLYCERIN (NITROSTAT) 0.4 MG SL tablet Place 0.4 mg under the tongue every 5 (five) minutes as needed for Chest pain.    pantoprazole (PROTONIX) 40 MG tablet Take 40 mg by mouth once daily.    phosphorated carbohydrate (EMETROL) Soln Take by mouth.    pyridoxine, vitamin B6, (VITAMIN B-6) 100 MG Tab Take 100 mg by mouth once daily.    rosuvastatin (CRESTOR) 10 MG tablet Take 10 mg by mouth once daily.     Family History     None         Social History Main Topics    Smoking status: Former Smoker    Smokeless tobacco: Not on file    Alcohol use Yes      Comment: wine    Drug use: No    Sexual activity: Not on file     Review of Systems   Constitution: Positive for weakness and malaise/fatigue.   HENT: Negative for hoarse voice and nosebleeds.    Eyes: Negative for vision loss in left eye and vision loss in right eye.   Cardiovascular: Positive for chest pain. Negative for dyspnea on exertion.   Respiratory: Negative for shortness of breath and wheezing.    Endocrine: Negative for polydipsia and polyphagia.   Musculoskeletal: Negative for joint swelling, muscle cramps and muscle weakness.   Gastrointestinal: Negative for abdominal pain, anorexia and change in bowel habit.   Neurological: Positive for disturbances in coordination. Negative for difficulty with concentration.   Psychiatric/Behavioral: Positive for memory loss. The patient is not nervous/anxious.      Objective:     Vital Signs (Most Recent):  Temp: 98.6 °F (37 °C) (01/13/18 1200)  Pulse: 69 (01/13/18 1315)  Resp: (!) 27 (01/13/18 1315)  BP: 119/63 (01/13/18 1315)  SpO2: 98 % (01/13/18 1315) Vital Signs (24h Range):  Temp:  [98.2 °F (36.8 °C)-98.6 °F (37 °C)] 98.6 °F (37 °C)  Pulse:  [58-97] 69  Resp:  [12-27] 27  SpO2:  [91 %-99 %] 98 %  BP: (112-174)/(57-82) 119/63     Weight: 96.8 kg (213 lb 8 oz)  Body mass index is 28.17 kg/m².    SpO2: 98 %  O2 Device (Oxygen Therapy): room air      Intake/Output Summary (Last 24 hours) at 01/13/18 1344  Last data filed at 01/13/18 1200   Gross per 24 hour   Intake                0 ml   Output              375 ml   Net             -375 ml       Lines/Drains/Airways     Drain                 Suprapubic Catheter 01/13/13 1826 days                Physical Exam   Constitutional: He appears well-developed and well-nourished.   HENT:   Head: Normocephalic and atraumatic.   Eyes: Conjunctivae are normal. Pupils are equal, round, and reactive to  light.   Neck: Normal range of motion. Neck supple. No JVD present.   Cardiovascular: Normal rate, regular rhythm and normal heart sounds.  Exam reveals no gallop and no friction rub.    No murmur heard.  Pulmonary/Chest: Effort normal and breath sounds normal. No respiratory distress. He has no wheezes. He has no rales.   Abdominal: Soft. Bowel sounds are normal. He exhibits no distension. There is no tenderness.   Musculoskeletal: Normal range of motion. He exhibits no edema.   Neurological: He is alert.   Skin: Skin is warm and dry. No erythema.   Psychiatric: He has a normal mood and affect. His behavior is normal.       Significant Labs:   BMP:   Recent Labs  Lab 01/13/18  0915   *      K 4.1      CO2 25   BUN 20   CREATININE 1.0   CALCIUM 9.6   MG 2.4       Significant Imaging: Echocardiogram: 2D echo with color flow doppler: No results found for this or any previous visit.

## 2018-01-13 NOTE — H&P
[facility]  Cardiothoracic Surgery  History & Physical    Patient Name: Flo De La Torre  MRN: [id number]  Admission Date: 1/13/2018  Attending Physician: Flo Mercedes MD  Referring Provider: Referring, Unknown    Patient information was obtained from spouse/SO, relative(s), past medical records and ER records.     Subjective:     Chief Complaint/Reason for Admission: unstable angina    History of Present Illness: 83 y/o male c pmhc of severe three vessel CAD with prior PCA in [place], HTN, HLD, T2DM, BPH, alzheimer's dementia, carotid aa. Stenosis, and TIA who presented to OSH with unstable angina despite beta blocker, nitrate, and 4 baby aspirin.     States CP lasted 10-15 min, radiated down arm. SOB week prior to episode. Woke up patient from sleep.   Dx with stable angina 2 weeks prior, started on beta- blockade. Prior LHC in 2002 showed severe diffuse CAD with  of RCA collateralized and preserved LV fxn. Patient interactive, issues with short term memory. Able to care perform ADL's.     LHC at [facility] 1/11/18 showed:   - distal LMCA 90%   - ostial LAD 90%  - ostial LCx 90%  - mid RCA 95%  - EF 55%    Echo 1/12/17: disc, not report     Functionally: shuffling walk, able to walk 1/2 mile per daughter with a cane    No current facility-administered medications on file prior to encounter.      No current outpatient prescriptions on file prior to encounter.       Review of patient's allergies indicates:   Allergen Reactions    Iodine and iodide containing products Anaphylaxis     Past Medical History:   Diagnosis Date    3-vessel CAD     Alzheimer disease     Ataxia     BPH (benign prostatic hyperplasia)     Carotid stenosis     Cerebellar ataxia     Chronic nausea     HLD (hyperlipidemia)     HTN (hypertension)     TIA (transient ischemic attack)     Type 2 diabetes mellitus      Past Surgical History:   Procedure Laterality Date    APPENDECTOMY      CARDIAC  CATHETERIZATION      malignant melanoma        Family History     None        Social History Main Topics    Smoking status: Former Smoker    Smokeless tobacco: Not on file    Alcohol use Yes      Comment: wine    Drug use: No    Sexual activity: Not on file     Review of Systems   Constitutional: Negative for activity change.   HENT: Negative for congestion.    Respiratory: Negative for cough, shortness of breath and wheezing.    Cardiovascular: Negative for chest pain and palpitations.   Gastrointestinal: Negative for abdominal distention and abdominal pain.   Musculoskeletal: Positive for arthralgias.   All other systems reviewed and are negative.    Objective:     Vital Signs (Most Recent):  Temp: 98.2 °F (36.8 °C) (01/13/18 0830)  Pulse: 63 (01/13/18 0700)  Resp: 18 (01/13/18 0830)  BP: (!) 174/70 (01/13/18 0830)  SpO2: (!) 91 % (01/13/18 0830) Vital Signs (24h Range):  Temp:  [98.2 °F (36.8 °C)-98.4 °F (36.9 °C)] 98.2 °F (36.8 °C)  Pulse:  [63-74] 63  Resp:  [18-20] 18  SpO2:  [91 %-94 %] 91 %  BP: (172-174)/(70-77) 174/70     Weight: 96.8 kg (213 lb 8 oz)  Body mass index is 28.17 kg/m².    SpO2: (!) 91 %  O2 Device (Oxygen Therapy): room air     Intake/Output - Last 3 Shifts     None           Lines/Drains/Airways          No matching active lines, drains, or airways        STS Risk Score:       Physical Exam   Constitutional: He is oriented to person, place, and time. He appears well-developed and well-nourished. No distress.   HENT:   Head: Normocephalic and atraumatic.   Eyes: No scleral icterus.   Cardiovascular: Normal rate and regular rhythm.    hypertensive   Pulmonary/Chest: Effort normal. No stridor. No respiratory distress.   Abdominal: Soft. He exhibits no distension. There is no tenderness.   Musculoskeletal: He exhibits no edema.   Short, shuffling gait   Neurological: He is alert and oriented to person, place, and time.   Issues with short term memory   Nursing note and vitals  reviewed.    Significant Labs:  Cardiac markers: No results for input(s): CKMB, CPKMB, TROPONINT, TROPONINI, MYOGLOBIN in the last 48 hours.  CBC: No results for input(s): WBC, RBC, HGB, HCT, PLT, MCV, MCH, MCHC in the last 48 hours.  CMP: No results for input(s): GLU, CALCIUM, ALBUMIN, PROT, NA, K, CO2, CL, BUN, CREATININE, ALKPHOS, ALT, AST, BILITOT in the last 48 hours.  Coagulation: No results for input(s): PT, INR, APTT in the last 48 hours.  Lactic Acid: No results for input(s): LACTATE in the last 48 hours.  All pertinent labs from the last 24 hours have been reviewed.    Significant Diagnostics:  I have reviewed and interpreted all pertinent imaging results/findings within the past 24 hours.    Assessment/Plan:     NYHA Score: NYHA II: slight limitation of physical activity, comfortable at rest    3-vessel CAD    83 y/o male with multiple comorbidities including dementia presents for evaluation of urgent CABG for 3 vessel CAD, admitted to OSH with unstable angina. Stable now without CP.     - admit to CTS, orders placed  - home meds started  - review remaining home meds   - will discuss and review imaging with Dr. Daren Brito MD  Cardiothoracic Surgery  [facility] Attending Note:    I have personally taken the history and examined this patient and agree with the resident's note as stated above. Will ask interventional to see. Am very concerned given history of mild dementia that CABG would be greater cognitive risk than cardiac benefit.

## 2018-01-13 NOTE — ASSESSMENT & PLAN NOTE
Pt with 3 vessel dz with unstable angina, trops pending, - on nitro drip, will plan on taking to cath lab, PCI with likely rotablator, likely use impella support.

## 2018-01-13 NOTE — ASSESSMENT & PLAN NOTE
Pt is a 81 y/o male w hx of mild dementia, hypertension, hyperlipidemia, severe three vessel CAD with prior PCTA in [place], HTN, HLD, T2DM, BPH, carotid artery Stenosis, and TIA who presented to OSH with unstable angina despite beta blocker, nitrate, and 4 baby aspirin. States CP lasted 10-15 min, radiated down arm. SOB week prior to episode. Woke up patient from sleep. While there had a LHC with  distal LMCA 90%, ostial LAD 90%, ostial LCx 90%, mid RCA 95%, and EF 55%. He was initially on nitro gtt there but on transfer that was stopped and he was on the floor here. Today he had chest pain that was refractory to sublingual nitro so cardiology was consulted. On evaluation of the patient EKG with diffuse ST depressions in inferior, anterior, and lateral leads. He was transferred to the ICU and started on nitroglycerin gtt. Which helped a bit with the chest pain but did not relieve it all the way. Case discussed with CTS who will not offer bypass surgery.     Will plan for high risk PCI today with rotablator  Right CFA access and likely Left CFA access for impella support  Loaded with Brilinta   Solumedrol given for contrast allergy  Risks, benefits and alternatives have been explained to the patient and family who understand and agree with the procedure.

## 2018-01-13 NOTE — SIGNIFICANT EVENT
Rapid Response Nurse Note     Rapid Response Metrics:     Admit Date: 2018  LOS: 0  Code Status: Full Code   Date of Consult: 2018  : 1935  Age: 82 y.o.  Weight:   Wt Readings from Last 1 Encounters:   18 96.8 kg (213 lb 8 oz)     Race: n/a  Sex: male  Bed: 377A  MRN: [id number]  Time Rapid Response Team page Received: Called by Charge Zen CALLE @1120  Time Rapid Response Team at Bedside: 1125  Time Rapid Response Team left Bedside: 1150  Was the patient discharged from an ICU this admission? no   Was the patient discharged from a PACU within last 24 hours? no  Did the patient receive conscious sedation/general anesthesia within last 24 hours? no  Was the patient in the ED within the past 24 hours? no  Was the patient started on NIPPV within the past 24 hours? no  Did this progress into an ARC or CPA: no  Attending Physician: Lillie Fuller MD  Primary Service: Networked reference to record PCT   Consult Requested By: Lillie Fuller MD   Rapid Response Indication(s): Chest pain  Disposition: 6091    SITUATION:     Reason for Call:   Called to evaluate the patient for Dysrythmia    BACKGROUND:     What changed?: Chest pain    ASSESSMENT:     What did you find: RRT RN called by CSU charge Zen CALLE to come evaluate patient and initiate nitro gtt. Patient awaiting ICU bed for transfer. Upon entering room, patient connected to portable monitor with VSS. On 3L nasal cannula and states some mild chest discomfort. Report already called to SICU for transfer. At this time patient transported to room 6091.     RECOMMENDATIONS:     We recommend: transfer to ICU    FOLLOW-UP/CONTINGENCY PLAN:     Call back the rapid Response Nurse at [phone]        PHYSICIAN ESCALATION:     Orders received and case discussed with Dr Miller      Disposition: 6054

## 2018-01-13 NOTE — PROGRESS NOTES
Patient admitted to CSU. Patient arrived to the floor from the [facility]. VSS and NAND. No complaints at this time. Patient oriented to room and unit. MD Mcfarlane notified. Plan of care initiated. Bed in lowest lock position. Side rails up X 2. Call bed in reach. Will Continue to monitor patient.

## 2018-01-13 NOTE — CONSULTS
[facility]  Interventional Cardiology  Consult Note    Patient Name: Flo De La Torre  MRN: [id number]  Admission Date: 1/13/2018  Hospital Length of Stay: 0 days  Code Status: Full Code   Attending Provider: Lillie Fuller MD  Consulting Provider: Caimlle Cruz MD  Primary Care Physician: Primary Doctor No  Principal Problem:3-vessel CAD    Patient information was obtained from patient and ER records.     Consults  Subjective:     Chief Complaint: Chest pain    HPI:     Chief Complaint/Reason for Admission: unstable angina     History of Present Illness: 83 y/o male c pmhc of severe three vessel CAD with prior PCA in [place], HTN, HLD, T2DM, BPH, alzheimer's dementia, carotid aa. Stenosis, and TIA who presented to OSH with unstable angina despite beta blocker, nitrate, and 4 baby aspirin.     Initially Dx with stable angina 2 weeks prior, started on beta- blockade. Prior LHC in 2002 showed severe diffuse CAD with  of RCA collateralized and preserved LV fxn. Patient interactive, issues with short term memory. Able to care perform ADL's.     Today called to evaluate pt as he has intractable chest pain, and is being transferred to ICU for unstable angina.  EKG with diffuse ischemic ST depression.  Now pain free     LHC at [facility] 1/11/18 showed:   - distal LMCA 90%   - ostial LAD 90%  - ostial LCx 90%  - mid RCA 95%  - EF 55%         Past Medical History:   Diagnosis Date    3-vessel CAD     Alzheimer disease     Ataxia     BPH (benign prostatic hyperplasia)     Carotid stenosis     Cerebellar ataxia     Chronic nausea     HLD (hyperlipidemia)     HTN (hypertension)     TIA (transient ischemic attack)     Type 2 diabetes mellitus        Past Surgical History:   Procedure Laterality Date    APPENDECTOMY      CARDIAC CATHETERIZATION      malignant melanoma          Review of patient's allergies indicates:   Allergen Reactions    Iodine and iodide containing products  Anaphylaxis       PTA Medications   Medication Sig    amLODIPine (NORVASC) 5 MG tablet Take 5 mg by mouth once daily.    aspirin (ECOTRIN) 81 MG EC tablet Take 81 mg by mouth once daily.    carvedilol (COREG) 3.125 MG tablet Take 3.125 mg by mouth 2 (two) times daily with meals.    cyanocobalamin (VITAMIN B-12) 100 MCG tablet Take 1,000 mcg by mouth once daily.    dronabinol (MARINOL) 2.5 MG capsule Take 2.5 mg by mouth 2 (two) times daily before meals.    glimepiride (AMARYL) 2 MG tablet Take 2 mg by mouth before breakfast.    isosorbide mononitrate (IMDUR) 30 MG 24 hr tablet Take 30 mg by mouth once daily.    lisinopril (PRINIVIL,ZESTRIL) 20 MG tablet Take 20 mg by mouth once daily.    lubiprostone (AMITIZA) 24 MCG Cap Take 24 mcg by mouth 2 (two) times daily with meals.    MULTIVIT-IRON-MIN-FOLIC ACID 3,500-18-0.4 UNIT-MG-MG ORAL CHEW Take by mouth.    multivitamin (THERAGRAN) tablet Take 1 tablet by mouth once daily.    nitroGLYCERIN (NITROSTAT) 0.4 MG SL tablet Place 0.4 mg under the tongue every 5 (five) minutes as needed for Chest pain.    pantoprazole (PROTONIX) 40 MG tablet Take 40 mg by mouth once daily.    phosphorated carbohydrate (EMETROL) Soln Take by mouth.    pyridoxine, vitamin B6, (VITAMIN B-6) 100 MG Tab Take 100 mg by mouth once daily.    rosuvastatin (CRESTOR) 10 MG tablet Take 10 mg by mouth once daily.     Family History     None        Social History Main Topics    Smoking status: Former Smoker    Smokeless tobacco: Not on file    Alcohol use Yes      Comment: wine    Drug use: No    Sexual activity: Not on file     Review of Systems   Constitution: Positive for weakness and malaise/fatigue.   HENT: Negative for hoarse voice and nosebleeds.    Eyes: Negative for vision loss in left eye and vision loss in right eye.   Cardiovascular: Positive for chest pain. Negative for dyspnea on exertion.   Respiratory: Negative for shortness of breath and wheezing.    Endocrine:  Negative for polydipsia and polyphagia.   Musculoskeletal: Negative for joint swelling, muscle cramps and muscle weakness.   Gastrointestinal: Negative for abdominal pain, anorexia and change in bowel habit.   Neurological: Positive for disturbances in coordination. Negative for difficulty with concentration.   Psychiatric/Behavioral: Positive for memory loss. The patient is not nervous/anxious.      Objective:     Vital Signs (Most Recent):  Temp: 98.6 °F (37 °C) (01/13/18 1200)  Pulse: 69 (01/13/18 1315)  Resp: (!) 27 (01/13/18 1315)  BP: 119/63 (01/13/18 1315)  SpO2: 98 % (01/13/18 1315) Vital Signs (24h Range):  Temp:  [98.2 °F (36.8 °C)-98.6 °F (37 °C)] 98.6 °F (37 °C)  Pulse:  [58-97] 69  Resp:  [12-27] 27  SpO2:  [91 %-99 %] 98 %  BP: (112-174)/(57-82) 119/63     Weight: 96.8 kg (213 lb 8 oz)  Body mass index is 28.17 kg/m².    SpO2: 98 %  O2 Device (Oxygen Therapy): room air      Intake/Output Summary (Last 24 hours) at 01/13/18 1344  Last data filed at 01/13/18 1200   Gross per 24 hour   Intake                0 ml   Output              375 ml   Net             -375 ml       Lines/Drains/Airways     Drain                 Suprapubic Catheter 01/13/13 1826 days                Physical Exam   Constitutional: He appears well-developed and well-nourished.   HENT:   Head: Normocephalic and atraumatic.   Eyes: Conjunctivae are normal. Pupils are equal, round, and reactive to light.   Neck: Normal range of motion. Neck supple. No JVD present.   Cardiovascular: Normal rate, regular rhythm and normal heart sounds.  Exam reveals no gallop and no friction rub.    No murmur heard.  Pulmonary/Chest: Effort normal and breath sounds normal. No respiratory distress. He has no wheezes. He has no rales.   Abdominal: Soft. Bowel sounds are normal. He exhibits no distension. There is no tenderness.   Musculoskeletal: Normal range of motion. He exhibits no edema.   Neurological: He is alert.   Skin: Skin is warm and dry. No  erythema.   Psychiatric: He has a normal mood and affect. His behavior is normal.       Significant Labs:   BMP:   Recent Labs  Lab 01/13/18  0915   *      K 4.1      CO2 25   BUN 20   CREATININE 1.0   CALCIUM 9.6   MG 2.4       Significant Imaging: Echocardiogram: 2D echo with color flow doppler: No results found for this or any previous visit.    Assessment and Plan:     * 3-vessel CAD    Pt with 3 vessel dz with unstable angina, trops pending, - on nitro drip, will plan on taking to cath lab, PCI with likely rotablator, likely use impella support.        Unstable angina    See above.            VTE Risk Mitigation         Ordered     Low Risk of VTE  Once      01/13/18 0816          Thank you for your consult. I will sign off. Please contact us if you have any additional questions.    Camille Cruz MD  Interventional Cardiology   [facility]

## 2018-01-14 LAB
ALBUMIN SERPL BCP-MCNC: 3.3 G/DL
ALP SERPL-CCNC: 60 U/L
ALT SERPL W/O P-5'-P-CCNC: 51 U/L
ANION GAP SERPL CALC-SCNC: 7 MMOL/L
ANION GAP SERPL CALC-SCNC: 9 MMOL/L
APTT BLDCRRT: <21 SEC
AST SERPL-CCNC: 286 U/L
BASOPHILS # BLD AUTO: 0.02 K/UL
BASOPHILS NFR BLD: 0.1 %
BILIRUB SERPL-MCNC: 0.9 MG/DL
BUN SERPL-MCNC: 18 MG/DL
BUN SERPL-MCNC: 24 MG/DL
CALCIUM SERPL-MCNC: 8.5 MG/DL
CALCIUM SERPL-MCNC: 8.6 MG/DL
CHLORIDE SERPL-SCNC: 105 MMOL/L
CHLORIDE SERPL-SCNC: 107 MMOL/L
CO2 SERPL-SCNC: 22 MMOL/L
CO2 SERPL-SCNC: 25 MMOL/L
CREAT SERPL-MCNC: 0.8 MG/DL
CREAT SERPL-MCNC: 0.9 MG/DL
DIFFERENTIAL METHOD: ABNORMAL
EOSINOPHIL # BLD AUTO: 0 K/UL
EOSINOPHIL NFR BLD: 0 %
ERYTHROCYTE [DISTWIDTH] IN BLOOD BY AUTOMATED COUNT: 13.2 %
EST. GFR  (AFRICAN AMERICAN): >60 ML/MIN/1.73 M^2
EST. GFR  (AFRICAN AMERICAN): >60 ML/MIN/1.73 M^2
EST. GFR  (NON AFRICAN AMERICAN): >60 ML/MIN/1.73 M^2
EST. GFR  (NON AFRICAN AMERICAN): >60 ML/MIN/1.73 M^2
GLUCOSE SERPL-MCNC: 153 MG/DL
GLUCOSE SERPL-MCNC: 157 MG/DL
HCT VFR BLD AUTO: 36.2 %
HGB BLD-MCNC: 12.3 G/DL
IMM GRANULOCYTES # BLD AUTO: 0.1 K/UL
IMM GRANULOCYTES NFR BLD AUTO: 0.5 %
INR PPP: 1
LYMPHOCYTES # BLD AUTO: 0.9 K/UL
LYMPHOCYTES NFR BLD: 4.6 %
MAGNESIUM SERPL-MCNC: 2.1 MG/DL
MAGNESIUM SERPL-MCNC: 2.4 MG/DL
MCH RBC QN AUTO: 29.4 PG
MCHC RBC AUTO-ENTMCNC: 34 G/DL
MCV RBC AUTO: 86 FL
MONOCYTES # BLD AUTO: 1.4 K/UL
MONOCYTES NFR BLD: 7.1 %
NEUTROPHILS # BLD AUTO: 17.2 K/UL
NEUTROPHILS NFR BLD: 87.7 %
NRBC BLD-RTO: 0 /100 WBC
PHOSPHATE SERPL-MCNC: 2.5 MG/DL
PHOSPHATE SERPL-MCNC: 3.2 MG/DL
PLATELET # BLD AUTO: 223 K/UL
PMV BLD AUTO: 10.3 FL
POCT GLUCOSE: 139 MG/DL (ref 70–110)
POCT GLUCOSE: 177 MG/DL (ref 70–110)
POCT GLUCOSE: 201 MG/DL (ref 70–110)
POCT GLUCOSE: 227 MG/DL (ref 70–110)
POTASSIUM SERPL-SCNC: 3.6 MMOL/L
POTASSIUM SERPL-SCNC: 4.5 MMOL/L
PROT SERPL-MCNC: 6.7 G/DL
PROTHROMBIN TIME: 10.7 SEC
RBC # BLD AUTO: 4.19 M/UL
SODIUM SERPL-SCNC: 137 MMOL/L
SODIUM SERPL-SCNC: 138 MMOL/L
WBC # BLD AUTO: 19.58 K/UL

## 2018-01-14 PROCEDURE — 80053 COMPREHEN METABOLIC PANEL: CPT

## 2018-01-14 PROCEDURE — 25000003 PHARM REV CODE 250: Performed by: INTERNAL MEDICINE

## 2018-01-14 PROCEDURE — 63600175 PHARM REV CODE 636 W HCPCS: Performed by: STUDENT IN AN ORGANIZED HEALTH CARE EDUCATION/TRAINING PROGRAM

## 2018-01-14 PROCEDURE — 93010 ELECTROCARDIOGRAM REPORT: CPT | Mod: ,,, | Performed by: INTERNAL MEDICINE

## 2018-01-14 PROCEDURE — 25000003 PHARM REV CODE 250: Performed by: STUDENT IN AN ORGANIZED HEALTH CARE EDUCATION/TRAINING PROGRAM

## 2018-01-14 PROCEDURE — 80048 BASIC METABOLIC PNL TOTAL CA: CPT

## 2018-01-14 PROCEDURE — 63600175 PHARM REV CODE 636 W HCPCS

## 2018-01-14 PROCEDURE — 93005 ELECTROCARDIOGRAM TRACING: CPT

## 2018-01-14 PROCEDURE — 99232 SBSQ HOSP IP/OBS MODERATE 35: CPT | Mod: GC,,, | Performed by: INTERNAL MEDICINE

## 2018-01-14 PROCEDURE — 83735 ASSAY OF MAGNESIUM: CPT | Mod: 91

## 2018-01-14 PROCEDURE — 83735 ASSAY OF MAGNESIUM: CPT

## 2018-01-14 PROCEDURE — 84100 ASSAY OF PHOSPHORUS: CPT | Mod: 91

## 2018-01-14 PROCEDURE — 63600175 PHARM REV CODE 636 W HCPCS: Performed by: INTERNAL MEDICINE

## 2018-01-14 PROCEDURE — 85025 COMPLETE CBC W/AUTO DIFF WBC: CPT

## 2018-01-14 PROCEDURE — 85610 PROTHROMBIN TIME: CPT

## 2018-01-14 PROCEDURE — 20000000 HC ICU ROOM

## 2018-01-14 PROCEDURE — 85730 THROMBOPLASTIN TIME PARTIAL: CPT

## 2018-01-14 PROCEDURE — 84100 ASSAY OF PHOSPHORUS: CPT

## 2018-01-14 RX ORDER — ENOXAPARIN SODIUM 100 MG/ML
40 INJECTION SUBCUTANEOUS EVERY 24 HOURS
Status: DISCONTINUED | OUTPATIENT
Start: 2018-01-14 | End: 2018-01-16 | Stop reason: HOSPADM

## 2018-01-14 RX ORDER — FUROSEMIDE 10 MG/ML
40 INJECTION INTRAMUSCULAR; INTRAVENOUS ONCE
Status: DISCONTINUED | OUTPATIENT
Start: 2018-01-14 | End: 2018-01-14

## 2018-01-14 RX ORDER — CARVEDILOL 25 MG/1
25 TABLET ORAL 2 TIMES DAILY
Status: DISCONTINUED | OUTPATIENT
Start: 2018-01-14 | End: 2018-01-15

## 2018-01-14 RX ORDER — POTASSIUM CHLORIDE 7.45 MG/ML
40 INJECTION INTRAVENOUS ONCE
Status: COMPLETED | OUTPATIENT
Start: 2018-01-14 | End: 2018-01-14

## 2018-01-14 RX ORDER — FUROSEMIDE 10 MG/ML
20 INJECTION INTRAMUSCULAR; INTRAVENOUS ONCE
Status: COMPLETED | OUTPATIENT
Start: 2018-01-14 | End: 2018-01-14

## 2018-01-14 RX ORDER — POTASSIUM CHLORIDE 20 MEQ/1
40 TABLET, EXTENDED RELEASE ORAL ONCE
Status: DISCONTINUED | OUTPATIENT
Start: 2018-01-14 | End: 2018-01-15

## 2018-01-14 RX ORDER — FUROSEMIDE 10 MG/ML
INJECTION INTRAMUSCULAR; INTRAVENOUS
Status: DISPENSED
Start: 2018-01-14 | End: 2018-01-15

## 2018-01-14 RX ORDER — CARVEDILOL 12.5 MG/1
12.5 TABLET ORAL ONCE
Status: COMPLETED | OUTPATIENT
Start: 2018-01-14 | End: 2018-01-14

## 2018-01-14 RX ADMIN — TICAGRELOR 90 MG: 90 TABLET ORAL at 08:01

## 2018-01-14 RX ADMIN — POTASSIUM CHLORIDE 40 MEQ: 10 INJECTION, SOLUTION INTRAVENOUS at 10:01

## 2018-01-14 RX ADMIN — DRONABINOL 2.5 MG: 2.5 CAPSULE ORAL at 09:01

## 2018-01-14 RX ADMIN — CARVEDILOL 12.5 MG: 12.5 TABLET, FILM COATED ORAL at 09:01

## 2018-01-14 RX ADMIN — ONDANSETRON 4 MG: 2 INJECTION INTRAMUSCULAR; INTRAVENOUS at 12:01

## 2018-01-14 RX ADMIN — DRONABINOL 2.5 MG: 2.5 CAPSULE ORAL at 08:01

## 2018-01-14 RX ADMIN — ONDANSETRON 4 MG: 2 INJECTION INTRAMUSCULAR; INTRAVENOUS at 05:01

## 2018-01-14 RX ADMIN — CARVEDILOL 25 MG: 25 TABLET, FILM COATED ORAL at 09:01

## 2018-01-14 RX ADMIN — ONDANSETRON 4 MG: 4 TABLET, ORALLY DISINTEGRATING ORAL at 01:01

## 2018-01-14 RX ADMIN — INSULIN ASPART 1 UNITS: 100 INJECTION, SOLUTION INTRAVENOUS; SUBCUTANEOUS at 12:01

## 2018-01-14 RX ADMIN — ASPIRIN 81 MG: 81 TABLET, COATED ORAL at 08:01

## 2018-01-14 RX ADMIN — INSULIN ASPART 4 UNITS: 100 INJECTION, SOLUTION INTRAVENOUS; SUBCUTANEOUS at 01:01

## 2018-01-14 RX ADMIN — ENOXAPARIN SODIUM 40 MG: 100 INJECTION SUBCUTANEOUS at 12:01

## 2018-01-14 RX ADMIN — POLYETHYLENE GLYCOL 3350 17 G: 17 POWDER, FOR SOLUTION ORAL at 08:01

## 2018-01-14 RX ADMIN — CARVEDILOL 3.12 MG: 3.12 TABLET, FILM COATED ORAL at 08:01

## 2018-01-14 RX ADMIN — ROSUVASTATIN CALCIUM 20 MG: 20 TABLET, FILM COATED ORAL at 09:01

## 2018-01-14 RX ADMIN — FUROSEMIDE 20 MG: 10 INJECTION, SOLUTION INTRAMUSCULAR; INTRAVENOUS at 04:01

## 2018-01-14 RX ADMIN — LISINOPRIL 20 MG: 20 TABLET ORAL at 08:01

## 2018-01-14 RX ADMIN — AMLODIPINE BESYLATE 5 MG: 5 TABLET ORAL at 08:01

## 2018-01-14 RX ADMIN — TICAGRELOR 90 MG: 90 TABLET ORAL at 09:01

## 2018-01-14 NOTE — SUBJECTIVE & OBJECTIVE
Interval History: Patient denies or dypsnea.  No access site pain.    Objective:     Vital Signs (Most Recent):  Temp: 98.5 °F (36.9 °C) (01/13/18 1945)  Pulse: 76 (01/13/18 2030)  Resp: 19 (01/13/18 2030)  BP: (!) 163/82 (01/13/18 2030)  SpO2: 98 % (01/13/18 2030) Vital Signs (24h Range):  Temp:  [98.2 °F (36.8 °C)-98.6 °F (37 °C)] 98.5 °F (36.9 °C)  Pulse:  [58-97] 76  Resp:  [12-27] 19  SpO2:  [91 %-99 %] 98 %  BP: (112-174)/(57-85) 163/82     Weight: 96.8 kg (213 lb 8 oz)  Body mass index is 28.17 kg/m².    SpO2: 98 %  O2 Device (Oxygen Therapy): (P) nasal cannula      Intake/Output Summary (Last 24 hours) at 01/13/18 2115  Last data filed at 01/13/18 2001   Gross per 24 hour   Intake                0 ml   Output             1350 ml   Net            -1350 ml       Lines/Drains/Airways     Drain                 Suprapubic Catheter 01/13/13 1826 days                Physical Exam   Constitutional: He is oriented to person, place, and time. He appears well-developed and well-nourished.   HENT:   Head: Normocephalic and atraumatic.   Eyes: EOM are normal. Pupils are equal, round, and reactive to light.   Neck: Neck supple. No JVD present. No thyromegaly present.   Cardiovascular: Normal rate and regular rhythm.  PMI is displaced.  Exam reveals gallop and S4. Exam reveals no friction rub.    No murmur heard.  Pulses:       Carotid pulses are 2+ on the right side, and 2+ on the left side.       Radial pulses are 2+ on the right side, and 2+ on the left side.        Femoral pulses are 2+ on the right side, and 2+ on the left side.       Dorsalis pedis pulses are 2+ on the right side, and 1+ on the left side.        Posterior tibial pulses are 1+ on the right side, and 1+ on the left side.   Pulmonary/Chest: Effort normal. He has no wheezes. He has no rhonchi. He has no rales.   Abdominal: Soft. Normal appearance. He exhibits no distension. There is no hepatosplenomegaly. There is no tenderness.   Neurological: He is  alert and oriented to person, place, and time. Gait normal.   Psychiatric: His speech is normal.

## 2018-01-14 NOTE — PROCEDURES
Notified by patient's nurse that ACT<180secs.  5Fr right comon femoral vein  sheath removed.  Manual pressure held for 5 minutes.  Hemostasis achieved. No complications.

## 2018-01-14 NOTE — SUBJECTIVE & OBJECTIVE
Past Medical History:   Diagnosis Date    3-vessel CAD     Alzheimer disease     Ataxia     BPH (benign prostatic hyperplasia)     Carotid stenosis     Cerebellar ataxia     Chronic nausea     HLD (hyperlipidemia)     HTN (hypertension)     TIA (transient ischemic attack)     Type 2 diabetes mellitus        Past Surgical History:   Procedure Laterality Date    APPENDECTOMY      CARDIAC CATHETERIZATION      malignant melanoma          Review of patient's allergies indicates:   Allergen Reactions    Iodine and iodide containing products Anaphylaxis       No current facility-administered medications on file prior to encounter.      No current outpatient prescriptions on file prior to encounter.     Family History     None        Social History Main Topics    Smoking status: Former Smoker    Smokeless tobacco: Not on file    Alcohol use Yes      Comment: wine    Drug use: No    Sexual activity: Not on file     Review of Systems   All other systems reviewed and are negative.    Objective:     Vital Signs (Most Recent):  Temp: 98.5 °F (36.9 °C) (01/13/18 1945)  Pulse: 86 (01/13/18 2115)  Resp: 19 (01/13/18 2115)  BP: (!) 158/88 (01/13/18 2115)  SpO2: 98 % (01/13/18 2115) Vital Signs (24h Range):  Temp:  [98.2 °F (36.8 °C)-98.6 °F (37 °C)] 98.5 °F (36.9 °C)  Pulse:  [58-97] 86  Resp:  [12-27] 19  SpO2:  [91 %-99 %] 98 %  BP: (112-174)/(57-94) 158/88     Weight: 96.8 kg (213 lb 8 oz)  Body mass index is 28.17 kg/m².    SpO2: 98 %  O2 Device (Oxygen Therapy): (P) nasal cannula      Intake/Output Summary (Last 24 hours) at 01/13/18 2211  Last data filed at 01/13/18 2100   Gross per 24 hour   Intake                0 ml   Output             1605 ml   Net            -1605 ml       Lines/Drains/Airways     Drain                 Suprapubic Catheter 01/13/13 1826 days                Physical Exam   Constitutional: He appears well-developed and well-nourished.   HENT:   Head: Normocephalic and atraumatic.   Neck:  Normal range of motion. Neck supple. No JVD present.   Cardiovascular: Normal rate, regular rhythm and normal heart sounds.  Exam reveals no gallop and no friction rub.    No murmur heard.  Pulses:       Femoral pulses are 2+ on the right side, and 2+ on the left side.  Pulmonary/Chest: Effort normal and breath sounds normal. He has no rales.   Abdominal: Soft. Bowel sounds are normal.   Musculoskeletal: Normal range of motion. He exhibits no edema.   Neurological: He is alert.   Skin: Skin is warm and dry.       Significant Labs:   Recent Lab Results       01/13/18 2042 01/13/18 1956 01/13/18  1335 01/13/18  1238 01/13/18  0915      Immature Granulocytes  0.7(H)   0.7(H)     Immature Grans (Abs)  0.15(H)   0.13(H)     Albumin  3.2(L)   4.0     Alkaline Phosphatase  52(L)   73     ALT  39   19     Anion Gap  9   10     Appearance, UA    Clear      aPTT     <21.0  Comment:  aPTT therapeutic range = 39-69 seconds     AST  237(H)   45(H)     Baso #  0.02   0.03     Basophil%  0.1   0.2     Bilirubin (UA)    Negative      Total Bilirubin  0.7  Comment:  For infants and newborns, interpretation of results should be based  on gestational age, weight and in agreement with clinical  observations.  Premature Infant recommended reference ranges:  Up to 24 hours.............<8.0 mg/dL  Up to 48 hours............<12.0 mg/dL  3-5 days..................<15.0 mg/dL  6-29 days.................<15.0 mg/dL     0.6  Comment:  For infants and newborns, interpretation of results should be based  on gestational age, weight and in agreement with clinical  observations.  Premature Infant recommended reference ranges:  Up to 24 hours.............<8.0 mg/dL  Up to 48 hours............<12.0 mg/dL  3-5 days..................<15.0 mg/dL  6-29 days.................<15.0 mg/dL       BNP    310  Comment:  Values of less than 100 pg/ml are consistent with non-CHF populations.(H)      BUN, Bld  20   20     Calcium  8.4(L)   9.6     Chloride  106    104     CO2  24   25     Color, UA    Yellow      Coronary Stenosis   >= 50%(A)       Coronary Stent   Yes(A)       Creatinine  0.8   1.0     Differential Method  Automated   Automated     eGFR if   >60.0   >60.0     eGFR if non   >60.0  Comment:  Calculation used to obtain the estimated glomerular filtration  rate (eGFR) is the CKD-EPI equation.      >60.0  Comment:  Calculation used to obtain the estimated glomerular filtration  rate (eGFR) is the CKD-EPI equation.        Eos #  0.0   0.0     Eosinophil%  0.0   0.0     Glucose  199(H)   139(H)     Glucose, UA    Negative      Gran #  19.2(H)   15.4(H)     Gran%  92.9(H)   82.6(H)     Hematocrit  39.5(L)   43.8     Hemoglobin  12.7(L)   14.6     Coumadin Monitoring INR     1.0  Comment:  Coumadin Therapy:  2.0 - 3.0 for INR for all indicators except mechanical heart valves  and antiphospholipid syndromes which should use 2.5 - 3.5.       Ketones, UA    2+(A)      Leukocytes, UA    Negative      Lymph #  0.7(L)   1.9     Lymph%  3.5(L)   10.4(L)     Magnesium  1.8   2.4     MCH  28.8   29.5     MCHC  32.2   33.3     MCV  90   89     Mono #  0.6   1.1(H)     Mono%  2.8(L)   6.1     MPV  9.6   9.8     Nitrite, UA    Negative      nRBC  0   0     Occult Blood UA    Negative      pH, UA    6.0      Phosphorus  3.4   3.1     Platelets  225   219     POC ACTIVATED CLOTTING TIME K 147(H)         Potassium  4.1   4.1     Total Protein  6.6   8.0     Protein, UA    Negative  Comment:  Recommend a 24 hour urine protein or a urine   protein/creatinine ratio if globulin induced proteinuria is  clinically suspected.        Protime     10.5     RBC  4.41(L)   4.95     RDW  12.7   12.5     Sample unknown         Sodium  139   139     Specific [place], UA    1.020      Specimen UA    Urine, Catheterized      Troponin I    1.826  Comment:  The reference interval for Troponin I represents the 99th percentile   cutoff   for our facility and is  consistent with 3rd generation assay   performance.  (H)      Urobilinogen, UA    Negative      WBC  20.70(H)   18.59(H)                     Significant Imaging: EKG: NSR with ST depression in inf, anterior, lat leads

## 2018-01-14 NOTE — RESIDENT HANDOFF
Handoff     Primary Team: Networked reference to record PCT  Room Number: 6070/6070 A     Patient Name: Flo De La Torre MRN: [id number]     Date of Birth: [id number] Allergies: Iodine and iodide containing products     Age: 82 y.o. Admit Date: 1/13/2018     Sex: male  BMI: Body mass index is 28.17 kg/m².     Code Status: Full Code        Illness Level: Watcher - No    Reason for Admission: 3-vessel CAD    Brief HPI: Pt is a 81 y/o male w hx of mild dementia, hypertension, hyperlipidemia, severe three vessel CAD with prior PCTA in [place], HTN, HLD, T2DM, BPH, carotid artery Stenosis, and TIA who presented to OSH with unstable angina despite beta blocker, nitrate, and 4 baby aspirin. States CP lasted 10-15 min, radiated down arm. SOB week prior to episode. Woke up patient from sleep. While there had a LHC with  distal LMCA 90%, ostial LAD 90%, ostial LCx 90%, mid RCA 95%, and EF 55%. He was initially on nitro gtt there but on transfer that was stopped and he was on the floor here. Today he had chest pain that was refractory to sublingual nitro so cardiology was consulted. On evaluation of the patient EKG with diffuse ST depressions in inferior, anterior, and lateral leads. He was transferred to the ICU and started on nitroglycerin gtt. Which helped a bit with the chest pain but did not relieve it all the way.      Dx with stable angina 2 weeks prior, started on beta- blockade. Prior LHC in 2002 showed severe diffuse CAD with  of RCA collateralized and preserved LV fxn. Patient interactive, issues with short term memory. Able to care perform ADL's.      Echo 1/12/17: disc did not work but states EF is 75%     Functionally: shuffling walk, able to walk 1/2 mile per daughter with a cane    Procedure Date:  s/p multi-vessel PCI including LM, proximal LAD, mid LAD, proximal LCx, mid RCA, and distal RCA on 1/13/18    Hospital Course: Patient presented with unstable angina that was refractory to nitroglycerin so was  transferred to the ICU and started on a nitro gtt at 40 which helped with the pain but did not relieve it all the way. CTS evaluated the patient and his was deemed a poor candidate for CABG. The decision was made to take the patient to the cath lab. He was given solumedrol from prior dye allergy and loaded with brilinta. Multi-vessel PCI was performed with impella CP support with AMY to LM, proximal LAD, mid LAD, proximal LCx, mid RCA, and distal RCA.      Tasks:   New onset AFib: rate controlled, will monitor and see if patient converts to NSR on his own. Otherwise may consider DC cardioversion.    Patient needs evaluation by PT/OT: Patient's family has already chosen a rehab center for patient. Will need Social Work to further discuss with family tomorrow.     Estimated Discharge Date: 24-48 hours    Discharge Disposition: Rehab Facility    Mentored By: Dr. Fuller

## 2018-01-14 NOTE — NURSING
Monitor showing atrial fibrillation rate of 110, pt. Denies chest pain, sleeping.  Dr. Louise notified, order of EKG, tracing obtained.

## 2018-01-14 NOTE — ASSESSMENT & PLAN NOTE
Patient is s/p multi-vessel PCI including LM, proximal LAD, mid LAD, proximal LCx, mid RCA, and distal RCA.  Please see full report in EPIC  -Ticagrelor 90mg po BID X 1 year  -EC ASA 81mg po qday  -Coreg restarted  - gentle diuresis today as pt slightly volume overload on exam

## 2018-01-14 NOTE — PLAN OF CARE
Problem: Patient Care Overview  Goal: Plan of Care Review  Outcome: Ongoing (interventions implemented as appropriate)  POC reviewed with pt and family. Pt has history of dementia. Follows commands and oriented to self only, and is forgetful. Pt on no continuous IV medications. Skin free from breakdown. Pt left grain incision site oozing throughout shift. Pressure dressing applied. Pt on diabetic/cardiac diet, tolerating. Pt complains of nausea throughout shift, treated with medication. No emesis occurences. Pt on room air, 02 saturation between % throughout shift. UO between  ml.hr throughout shift. VSS. See flow sheets for details. Family to remain at bedside. Will continue to monitor.

## 2018-01-14 NOTE — ASSESSMENT & PLAN NOTE
Patient is s/p multi-vessel PCI including LM, proximal LAD, mid LAD, proximal LCx, mid RCA, and distal RCA.  Please see full report in EPIC  -Ticagrelor 90mg po BID X 1 year  -EC ASA 81mg po qday  -restart Coreg

## 2018-01-14 NOTE — ASSESSMENT & PLAN NOTE
Not very active but does not complain of any pain at present and no non healing ulcers at this time. Can further evaluate as an outpatient.

## 2018-01-14 NOTE — H&P
[facility]  Cardiology  History and Physical     Patient Name: Flo De La Torre  MRN: [id number]  Admission Date: 1/13/2018  Code Status: Full Code   Attending Provider: Lillie Fuller MD   Primary Care Physician: Primary Doctor No  Principal Problem:3-vessel CAD    Patient information was obtained from patient and family as well as EMR.     Subjective:     Chief Complaint:  Chest pain    HPI:  Pt is a 83 y/o male w hx of mild dementia, hypertension, hyperlipidemia, severe three vessel CAD with prior PCTA in [place], HTN, HLD, T2DM, BPH, carotid artery Stenosis, and TIA who presented to OSH with unstable angina despite beta blocker, nitrate, and 4 baby aspirin. States CP lasted 10-15 min, radiated down arm. SOB week prior to episode. Woke up patient from sleep. While there had a LHC with  distal LMCA 90%, ostial LAD 90%, ostial LCx 90%, mid RCA 95%, and EF 55%. He was initially on nitro gtt there but on transfer that was stopped and he was on the floor here. Today he had chest pain that was refractory to sublingual nitro so cardiology was consulted. On evaluation of the patient EKG with diffuse ST depressions in inferior, anterior, and lateral leads. He was transferred to the ICU and started on nitroglycerin gtt. Which helped a bit with the chest pain but did not relieve it all the way.     Dx with stable angina 2 weeks prior, started on beta- blockade. Prior LHC in 2002 showed severe diffuse CAD with  of RCA collateralized and preserved LV fxn. Patient interactive, issues with short term memory. Able to care perform ADL's.      Echo 1/12/17: disc did not work but states EF is 75%     Functionally: shuffling walk, able to walk 1/2 mile per daughter with a cane    Past Medical History:   Diagnosis Date    3-vessel CAD     Alzheimer disease     Ataxia     BPH (benign prostatic hyperplasia)     Carotid stenosis     Cerebellar ataxia     Chronic nausea     HLD (hyperlipidemia)      HTN (hypertension)     TIA (transient ischemic attack)     Type 2 diabetes mellitus        Past Surgical History:   Procedure Laterality Date    APPENDECTOMY      CARDIAC CATHETERIZATION      malignant melanoma          Review of patient's allergies indicates:   Allergen Reactions    Iodine and iodide containing products Anaphylaxis       No current facility-administered medications on file prior to encounter.      No current outpatient prescriptions on file prior to encounter.     Family History     None        Social History Main Topics    Smoking status: Former Smoker    Smokeless tobacco: Not on file    Alcohol use Yes      Comment: wine    Drug use: No    Sexual activity: Not on file     Review of Systems   All other systems reviewed and are negative.    Objective:     Vital Signs (Most Recent):  Temp: 98.5 °F (36.9 °C) (01/13/18 1945)  Pulse: 86 (01/13/18 2115)  Resp: 19 (01/13/18 2115)  BP: (!) 158/88 (01/13/18 2115)  SpO2: 98 % (01/13/18 2115) Vital Signs (24h Range):  Temp:  [98.2 °F (36.8 °C)-98.6 °F (37 °C)] 98.5 °F (36.9 °C)  Pulse:  [58-97] 86  Resp:  [12-27] 19  SpO2:  [91 %-99 %] 98 %  BP: (112-174)/(57-94) 158/88     Weight: 96.8 kg (213 lb 8 oz)  Body mass index is 28.17 kg/m².    SpO2: 98 %  O2 Device (Oxygen Therapy): (P) nasal cannula      Intake/Output Summary (Last 24 hours) at 01/13/18 2211  Last data filed at 01/13/18 2100   Gross per 24 hour   Intake                0 ml   Output             1605 ml   Net            -1605 ml       Lines/Drains/Airways     Drain                 Suprapubic Catheter 01/13/13 1826 days                Physical Exam   Constitutional: He appears well-developed and well-nourished.   HENT:   Head: Normocephalic and atraumatic.   Neck: Normal range of motion. Neck supple. No JVD present.   Cardiovascular: Normal rate, regular rhythm and normal heart sounds.  Exam reveals no gallop and no friction rub.    No murmur heard.  Pulses:       Femoral pulses are 2+  on the right side, and 2+ on the left side.  Pulmonary/Chest: Effort normal and breath sounds normal. He has no rales.   Abdominal: Soft. Bowel sounds are normal.   Musculoskeletal: Normal range of motion. He exhibits no edema.   Neurological: He is alert.   Skin: Skin is warm and dry.       Significant Labs:   Recent Lab Results       01/13/18 2042 01/13/18  1956 01/13/18  1335 01/13/18  1238 01/13/18  0915      Immature Granulocytes  0.7(H)   0.7(H)     Immature Grans (Abs)  0.15(H)   0.13(H)     Albumin  3.2(L)   4.0     Alkaline Phosphatase  52(L)   73     ALT  39   19     Anion Gap  9   10     Appearance, UA    Clear      aPTT     <21.0  Comment:  aPTT therapeutic range = 39-69 seconds     AST  237(H)   45(H)     Baso #  0.02   0.03     Basophil%  0.1   0.2     Bilirubin (UA)    Negative      Total Bilirubin  0.7  Comment:  For infants and newborns, interpretation of results should be based  on gestational age, weight and in agreement with clinical  observations.  Premature Infant recommended reference ranges:  Up to 24 hours.............<8.0 mg/dL  Up to 48 hours............<12.0 mg/dL  3-5 days..................<15.0 mg/dL  6-29 days.................<15.0 mg/dL     0.6  Comment:  For infants and newborns, interpretation of results should be based  on gestational age, weight and in agreement with clinical  observations.  Premature Infant recommended reference ranges:  Up to 24 hours.............<8.0 mg/dL  Up to 48 hours............<12.0 mg/dL  3-5 days..................<15.0 mg/dL  6-29 days.................<15.0 mg/dL       BNP    310  Comment:  Values of less than 100 pg/ml are consistent with non-CHF populations.(H)      BUN, Bld  20   20     Calcium  8.4(L)   9.6     Chloride  106   104     CO2  24   25     Color, UA    Yellow      Coronary Stenosis   >= 50%(A)       Coronary Stent   Yes(A)       Creatinine  0.8   1.0     Differential Method  Automated   Automated     eGFR if   >60.0    >60.0     eGFR if non   >60.0  Comment:  Calculation used to obtain the estimated glomerular filtration  rate (eGFR) is the CKD-EPI equation.      >60.0  Comment:  Calculation used to obtain the estimated glomerular filtration  rate (eGFR) is the CKD-EPI equation.        Eos #  0.0   0.0     Eosinophil%  0.0   0.0     Glucose  199(H)   139(H)     Glucose, UA    Negative      Gran #  19.2(H)   15.4(H)     Gran%  92.9(H)   82.6(H)     Hematocrit  39.5(L)   43.8     Hemoglobin  12.7(L)   14.6     Coumadin Monitoring INR     1.0  Comment:  Coumadin Therapy:  2.0 - 3.0 for INR for all indicators except mechanical heart valves  and antiphospholipid syndromes which should use 2.5 - 3.5.       Ketones, UA    2+(A)      Leukocytes, UA    Negative      Lymph #  0.7(L)   1.9     Lymph%  3.5(L)   10.4(L)     Magnesium  1.8   2.4     MCH  28.8   29.5     MCHC  32.2   33.3     MCV  90   89     Mono #  0.6   1.1(H)     Mono%  2.8(L)   6.1     MPV  9.6   9.8     Nitrite, UA    Negative      nRBC  0   0     Occult Blood UA    Negative      pH, UA    6.0      Phosphorus  3.4   3.1     Platelets  225   219     POC ACTIVATED CLOTTING TIME K 147(H)         Potassium  4.1   4.1     Total Protein  6.6   8.0     Protein, UA    Negative  Comment:  Recommend a 24 hour urine protein or a urine   protein/creatinine ratio if globulin induced proteinuria is  clinically suspected.        Protime     10.5     RBC  4.41(L)   4.95     RDW  12.7   12.5     Sample unknown         Sodium  139   139     Specific [place], UA    1.020      Specimen UA    Urine, Catheterized      Troponin I    1.826  Comment:  The reference interval for Troponin I represents the 99th percentile   cutoff   for our facility and is consistent with 3rd generation assay   performance.  (H)      Urobilinogen, UA    Negative      WBC  20.70(H)   18.59(H)                     Significant Imaging: EKG: NSR with ST depression in inf, anterior, lat leads    Assessment  and Plan:     * 3-vessel CAD    Patient today with unstable angina that was refractory to nitroglycerin so was transferred to the ICU and started on a nitro gtt at 40 which helped with the pain but did not relieve it all the way. CTS evaluated the patient and his was deemed a poor candidate for CABG. The decision was made to take the patient to the cath lab. He was given solumedrol from prior dye allergy and loaded with brilinta. Multi-vessel PCI was performed with impella CP support with AMY to LM, proximal LAD, mid LAD, proximal LCx, mid RCA, and distal RCA.  Please see full report in EPIC  - Ticagrelor 90mg po BID X 1 year  - EC ASA 81mg po qday  - Coreg          Type 2 diabetes mellitus    Will start on low dose sliding scale for now.         Cerebellar ataxia    Physical therapy to evaluate tomorrow.        Essential hypertension    Blood pressure meds restarted. Lisinopril 20, Norvasc 5, coreg 3.1235        Peripheral arterial disease    Not very active but does not complain of any pain at present and no non healing ulcers at this time. Can further evaluate as an outpatient.         Dyslipidemia    High dose statin therapy.   Will check cholesterol panel.             VTE Risk Mitigation         Ordered     Low Risk of VTE  Once      01/13/18 0816          Gaetano Miller MD  Cardiology   [facility]

## 2018-01-14 NOTE — ASSESSMENT & PLAN NOTE
Patient today with unstable angina that was refractory to nitroglycerin so was transferred to the ICU and started on a nitro gtt at 40 which helped with the pain but did not relieve it all the way. CTS evaluated the patient and his was deemed a poor candidate for CABG. The decision was made to take the patient to the cath lab. He was given solumedrol from prior dye allergy and loaded with brilinta. Multi-vessel PCI was performed with impella CP support with AMY to LM, proximal LAD, mid LAD, proximal LCx, mid RCA, and distal RCA.  Please see full report in EPIC  - Ticagrelor 90mg po BID X 1 year  - EC ASA 81mg po qday  - Coreg

## 2018-01-14 NOTE — HOSPITAL COURSE
Patient is s/p multi-vessel PCI including LM, proximal LAD, mid LAD, proximal LCx, mid RCA, and distal RCA.  Patient stable for step down to hospital medicine.

## 2018-01-14 NOTE — SUBJECTIVE & OBJECTIVE
Interval History: Patient with no complaints this AM.    Review of Systems   All other systems reviewed and are negative.    Objective:     Vital Signs (Most Recent):  Temp: 98.4 °F (36.9 °C) (01/14/18 1100)  Pulse: 97 (01/14/18 1230)  Resp: (!) 25 (01/14/18 1230)  BP: 117/78 (01/14/18 1130)  SpO2: 95 % (01/14/18 1230) Vital Signs (24h Range):  Temp:  [98.4 °F (36.9 °C)-98.9 °F (37.2 °C)] 98.4 °F (36.9 °C)  Pulse:  [] 97  Resp:  [0-35] 25  SpO2:  [94 %-100 %] 95 %  BP: (117-180)/() 117/78     Weight: 96.8 kg (213 lb 8 oz)  Body mass index is 28.17 kg/m².     SpO2: 95 %  O2 Device (Oxygen Therapy): room air      Intake/Output Summary (Last 24 hours) at 01/14/18 1553  Last data filed at 01/14/18 1300   Gross per 24 hour   Intake             2892 ml   Output             2490 ml   Net              402 ml       Lines/Drains/Airways     Drain                 Suprapubic Catheter 01/13/13 1827 days                Physical Exam   Constitutional: He is oriented to person, place, and time. He appears well-developed and well-nourished.   HENT:   Head: Normocephalic and atraumatic.   Eyes: Conjunctivae are normal. Pupils are equal, round, and reactive to light.   Neck: Normal range of motion. Neck supple. No JVD present.   Cardiovascular: Normal rate, regular rhythm and normal heart sounds.    Pulmonary/Chest: Effort normal. He has no rales.   Abdominal: Soft. Bowel sounds are normal. He exhibits no distension. There is no tenderness.   Musculoskeletal: Normal range of motion. He exhibits no edema.   Neurological: He is alert and oriented to person, place, and time.   Skin: Skin is warm and dry.   Psychiatric: He has a normal mood and affect. His behavior is normal.       Significant Labs:   BMP:   Recent Labs  Lab 01/13/18  0915 01/13/18  1956 01/14/18  0354   * 199* 153*    139 138   K 4.1 4.1 4.5    106 107   CO2 25 24 22*   BUN 20 20 18   CREATININE 1.0 0.8 0.8   CALCIUM 9.6 8.4* 8.5*   MG 2.4  1.8 2.4   , CBC   Recent Labs  Lab 01/13/18  0915 01/13/18  1956 01/14/18  0354   WBC 18.59* 20.70* 19.58*   HGB 14.6 12.7* 12.3*   HCT 43.8 39.5* 36.2*    225 223   , Lipid Panel   Recent Labs  Lab 01/13/18  2253   CHOL 159   HDL 50   LDLCALC 94.8   TRIG 71   CHOLHDL 31.4    and Troponin   Recent Labs  Lab 01/13/18  1238   TROPONINI 1.826*       Significant Imaging: Echocardiogram: 2D echo with color flow doppler: No results found for this or any previous visit. and X-Ray: CXR: X-Ray Chest 1 View (CXR):   Results for orders placed or performed during the hospital encounter of 01/13/18   X-Ray Chest 1 View    Narrative    Comparison: None available    Technique: Single AP chest radiograph.    Findings: Cardiac monitoring leads overlie the chest. Cardiomediastinal silhouette appears prominent with atherosclerosis and tortuosity of the thoracic aorta.  There is prominence of the pulmonary vasculature/pulmonary vascular congestion and slight increased parenchymal attenuation suggestive of pulmonary edema.  There is bibasilar subsegmental atelectasis.  No pneumothorax.    Impression    As above.      Electronically signed by: HUGO HYDE  Date:     01/14/18  Time:    05:43

## 2018-01-14 NOTE — SUBJECTIVE & OBJECTIVE
Interval History: pt doing well, did well with PCI yesterday, step down today    Objective:     Vital Signs (Most Recent):  Temp: 98.4 °F (36.9 °C) (01/14/18 1100)  Pulse: 97 (01/14/18 1230)  Resp: (!) 25 (01/14/18 1230)  BP: 117/78 (01/14/18 1130)  SpO2: 95 % (01/14/18 1230) Vital Signs (24h Range):  Temp:  [98.4 °F (36.9 °C)-98.9 °F (37.2 °C)] 98.4 °F (36.9 °C)  Pulse:  [] 97  Resp:  [0-35] 25  SpO2:  [94 %-100 %] 95 %  BP: (115-180)/() 117/78     Weight: 96.8 kg (213 lb 8 oz)  Body mass index is 28.17 kg/m².    SpO2: 95 %  O2 Device (Oxygen Therapy): room air      Intake/Output Summary (Last 24 hours) at 01/14/18 1257  Last data filed at 01/14/18 1200   Gross per 24 hour   Intake             2892 ml   Output             2470 ml   Net              422 ml       Lines/Drains/Airways     Drain                 Suprapubic Catheter 01/13/13 1827 days                Physical Exam   Constitutional: He is oriented to person, place, and time. He appears well-developed and well-nourished.   HENT:   Head: Normocephalic and atraumatic.   Eyes: Conjunctivae are normal. Pupils are equal, round, and reactive to light.   Neck: Normal range of motion. Neck supple. No JVD present.   Cardiovascular: Normal rate, regular rhythm and normal heart sounds.    Pulmonary/Chest: Effort normal. He has no rales.   Abdominal: Soft. Bowel sounds are normal. He exhibits no distension. There is no tenderness.   Musculoskeletal: Normal range of motion. He exhibits no edema.   Neurological: He is alert and oriented to person, place, and time.   Skin: Skin is warm and dry.   Psychiatric: He has a normal mood and affect. His behavior is normal.       Significant Labs:   CMP   Recent Labs  Lab 01/13/18  0915 01/13/18  1956 01/14/18  0354    139 138   K 4.1 4.1 4.5    106 107   CO2 25 24 22*   * 199* 153*   BUN 20 20 18   CREATININE 1.0 0.8 0.8   CALCIUM 9.6 8.4* 8.5*   PROT 8.0 6.6 6.7   ALBUMIN 4.0 3.2* 3.3*   BILITOT  0.6 0.7 0.9   ALKPHOS 73 52* 60   AST 45* 237* 286*   ALT 19 39 51*   ANIONGAP 10 9 9   ESTGFRAFRICA >60.0 >60.0 >60.0   EGFRNONAA >60.0 >60.0 >60.0       Significant Imaging: X-Ray: CXR: X-Ray Chest 1 View (CXR):   Results for orders placed or performed during the hospital encounter of 01/13/18   X-Ray Chest 1 View    Narrative    Comparison: None available    Technique: Single AP chest radiograph.    Findings: Cardiac monitoring leads overlie the chest. Cardiomediastinal silhouette appears prominent with atherosclerosis and tortuosity of the thoracic aorta.  There is prominence of the pulmonary vasculature/pulmonary vascular congestion and slight increased parenchymal attenuation suggestive of pulmonary edema.  There is bibasilar subsegmental atelectasis.  No pneumothorax.    Impression    As above.      Electronically signed by: HUGO HYDE  Date:     01/14/18  Time:    05:43

## 2018-01-14 NOTE — PROGRESS NOTES
[facility]  Interventional Cardiology  Progress Note    Patient Name: Flo De La Torre  MRN: [id number]  Admission Date: 1/13/2018  Hospital Length of Stay: 0 days  Code Status: Full Code   Attending Physician: Lillie Fuller MD   Primary Care Physician: Primary Doctor No  Principal Problem:3-vessel CAD    Subjective:     Interval History: Patient denies or dypsnea.  No access site pain.    Objective:     Vital Signs (Most Recent):  Temp: 98.5 °F (36.9 °C) (01/13/18 1945)  Pulse: 76 (01/13/18 2030)  Resp: 19 (01/13/18 2030)  BP: (!) 163/82 (01/13/18 2030)  SpO2: 98 % (01/13/18 2030) Vital Signs (24h Range):  Temp:  [98.2 °F (36.8 °C)-98.6 °F (37 °C)] 98.5 °F (36.9 °C)  Pulse:  [58-97] 76  Resp:  [12-27] 19  SpO2:  [91 %-99 %] 98 %  BP: (112-174)/(57-85) 163/82     Weight: 96.8 kg (213 lb 8 oz)  Body mass index is 28.17 kg/m².    SpO2: 98 %  O2 Device (Oxygen Therapy): (P) nasal cannula      Intake/Output Summary (Last 24 hours) at 01/13/18 2115  Last data filed at 01/13/18 2001   Gross per 24 hour   Intake                0 ml   Output             1350 ml   Net            -1350 ml       Lines/Drains/Airways     Drain                 Suprapubic Catheter 01/13/13 1826 days                Physical Exam   Constitutional: He is oriented to person, place, and time. He appears well-developed and well-nourished.   HENT:   Head: Normocephalic and atraumatic.   Eyes: EOM are normal. Pupils are equal, round, and reactive to light.   Neck: Neck supple. No JVD present. No thyromegaly present.   Cardiovascular: Normal rate and regular rhythm.  PMI is displaced.  Exam reveals gallop and S4. Exam reveals no friction rub.    No murmur heard.  Pulses:       Carotid pulses are 2+ on the right side, and 2+ on the left side.       Radial pulses are 2+ on the right side, and 2+ on the left side.        Femoral pulses are 2+ on the right side, and 2+ on the left side.       Dorsalis pedis pulses are 2+ on the  right side, and 1+ on the left side.        Posterior tibial pulses are 1+ on the right side, and 1+ on the left side.   Pulmonary/Chest: Effort normal. He has no wheezes. He has no rhonchi. He has no rales.   Abdominal: Soft. Normal appearance. He exhibits no distension. There is no hepatosplenomegaly. There is no tenderness.   Neurological: He is alert and oriented to person, place, and time. Gait normal.   Psychiatric: His speech is normal.         Assessment and Plan:     Patient is a 82 y.o. male presenting with:    * 3-vessel CAD    Patient is s/p multi-vessel PCI including LM, proximal LAD, mid LAD, proximal LCx, mid RCA, and distal RCA.  Please see full report in EPIC  -Ticagrelor 90mg po BID X 1 year  -EC ASA 81mg po qday  -restart Coreg        NSTEMI (non-ST elevated myocardial infarction)    See above  -request cardiac rehab consult        S/P drug eluting coronary stent placement    -DAPT as above  -phase 1 cardiac rehab consult        Cerebellar ataxia    -patient currently on bedrest; he reports using a cane to ambulate  -rec PT consult in AM        Dementia    -patient has poor short term memory; family is at bedside providing reassurance to patient        Essential hypertension    -as above restart beta-blocker          Peripheral arterial disease    -no evidence of CLI  -outpatient bilateral LE duplex        Dyslipidemia    -re high intensity statin            VTE Risk Mitigation         Ordered     Low Risk of VTE  Once      01/13/18 0816          Dano Soriano MD  Interventional Cardiology  [facility]

## 2018-01-14 NOTE — PROGRESS NOTES
[facility]  Cardiology  Progress Note    Patient Name: Flo De La Torre  MRN: [id number]  Admission Date: 1/13/2018  Hospital Length of Stay: 1 days  Code Status: Full Code   Attending Physician: Lillie Fuller MD   Primary Care Physician: Primary Doctor No  Expected Discharge Date:   Principal Problem:3-vessel CAD    Subjective:     Hospital Course:   Patient is s/p multi-vessel PCI including LM, proximal LAD, mid LAD, proximal LCx, mid RCA, and distal RCA.   Patient stable for step down to hospital medicine.     Interval History: Patient with no complaints this AM.    Review of Systems   All other systems reviewed and are negative.    Objective:     Vital Signs (Most Recent):  Temp: 98.4 °F (36.9 °C) (01/14/18 1100)  Pulse: 97 (01/14/18 1230)  Resp: (!) 25 (01/14/18 1230)  BP: 117/78 (01/14/18 1130)  SpO2: 95 % (01/14/18 1230) Vital Signs (24h Range):  Temp:  [98.4 °F (36.9 °C)-98.9 °F (37.2 °C)] 98.4 °F (36.9 °C)  Pulse:  [] 97  Resp:  [0-35] 25  SpO2:  [94 %-100 %] 95 %  BP: (117-180)/() 117/78     Weight: 96.8 kg (213 lb 8 oz)  Body mass index is 28.17 kg/m².     SpO2: 95 %  O2 Device (Oxygen Therapy): room air      Intake/Output Summary (Last 24 hours) at 01/14/18 1553  Last data filed at 01/14/18 1300   Gross per 24 hour   Intake             2892 ml   Output             2490 ml   Net              402 ml       Lines/Drains/Airways     Drain                 Suprapubic Catheter 01/13/13 1827 days                Physical Exam   Constitutional: He is oriented to person, place, and time. He appears well-developed and well-nourished.   HENT:   Head: Normocephalic and atraumatic.   Eyes: Conjunctivae are normal. Pupils are equal, round, and reactive to light.   Neck: Normal range of motion. Neck supple. No JVD present.   Cardiovascular: Normal rate, regular rhythm and normal heart sounds.    Pulmonary/Chest: Effort normal. He has no rales.   Abdominal: Soft. Bowel sounds are  normal. He exhibits no distension. There is no tenderness.   Musculoskeletal: Normal range of motion. He exhibits no edema.   Neurological: He is alert and oriented to person, place, and time.   Skin: Skin is warm and dry.   Psychiatric: He has a normal mood and affect. His behavior is normal.       Significant Labs:   BMP:   Recent Labs  Lab 01/13/18 0915 01/13/18 1956 01/14/18  0354   * 199* 153*    139 138   K 4.1 4.1 4.5    106 107   CO2 25 24 22*   BUN 20 20 18   CREATININE 1.0 0.8 0.8   CALCIUM 9.6 8.4* 8.5*   MG 2.4 1.8 2.4   , CBC   Recent Labs  Lab 01/13/18 0915 01/13/18 1956 01/14/18  0354   WBC 18.59* 20.70* 19.58*   HGB 14.6 12.7* 12.3*   HCT 43.8 39.5* 36.2*    225 223   , Lipid Panel   Recent Labs  Lab 01/13/18  2253   CHOL 159   HDL 50   LDLCALC 94.8   TRIG 71   CHOLHDL 31.4    and Troponin   Recent Labs  Lab 01/13/18  1238   TROPONINI 1.826*       Significant Imaging: Echocardiogram: 2D echo with color flow doppler: No results found for this or any previous visit. and X-Ray: CXR: X-Ray Chest 1 View (CXR):   Results for orders placed or performed during the hospital encounter of 01/13/18   X-Ray Chest 1 View    Narrative    Comparison: None available    Technique: Single AP chest radiograph.    Findings: Cardiac monitoring leads overlie the chest. Cardiomediastinal silhouette appears prominent with atherosclerosis and tortuosity of the thoracic aorta.  There is prominence of the pulmonary vasculature/pulmonary vascular congestion and slight increased parenchymal attenuation suggestive of pulmonary edema.  There is bibasilar subsegmental atelectasis.  No pneumothorax.    Impression    As above.      Electronically signed by: HUGO HYDE  Date:     01/14/18  Time:    05:43      Assessment and Plan:     Brief HPI: Pt is a 83 y/o male w hx of mild dementia, hypertension, hyperlipidemia, severe three vessel CAD with prior PCTA in [place], HTN, HLD, T2DM, BPH, carotid artery  Stenosis, and TIA who presented to OSH with unstable angina despite beta blocker, nitrate, and 4 baby aspirin. States CP lasted 10-15 min, radiated down arm. SOB week prior to episode. Woke up patient from sleep. While there had a LHC with  distal LMCA 90%, ostial LAD 90%, ostial LCx 90%, mid RCA 95%, and EF 55%. He was initially on nitro gtt there but on transfer that was stopped and he was on the floor here. Today he had chest pain that was refractory to sublingual nitro so cardiology was consulted. On evaluation of the patient EKG with diffuse ST depressions in inferior, anterior, and lateral leads. He was transferred to the ICU and started on nitroglycerin gtt. Which helped a bit with the chest pain but did not relieve it all the way.      Dx with stable angina 2 weeks prior, started on beta- blockade. Prior LHC in 2002 showed severe diffuse CAD with  of RCA collateralized and preserved LV fxn. Patient interactive, issues with short term memory. Able to care perform ADL's.      Echo 1/12/17: disc did not work but states EF is 75%     Functionally: shuffling walk, able to walk 1/2 mile per daughter with a cane    * 3-vessel CAD    Patient today with unstable angina that was refractory to nitroglycerin so was transferred to the ICU and started on a nitro gtt at 40 which helped with the pain but did not relieve it all the way. CTS evaluated the patient and his was deemed a poor candidate for CABG. The decision was made to take the patient to the cath lab. He was given solumedrol from prior dye allergy and loaded with brilinta. Multi-vessel PCI was performed with [employer] Fundella CP support with AMY to LM, proximal LAD, mid LAD, proximal LCx, mid RCA, and distal RCA.    - Ticagrelor 90mg po BID X 1 year  - EC ASA 81mg po qday  - Coreg          Type 2 diabetes mellitus    Will start on low dose sliding scale for now.         Cerebellar ataxia    Physical therapy to evaluate         Essential hypertension    Blood pressure  meds restarted. Lisinopril 20, Norvasc 5  Resumed home coreg today        Peripheral arterial disease    Not very active but does not complain of any pain at present and no non healing ulcers at this time. Can further evaluate as an outpatient.         Dyslipidemia    High dose statin therapy.   Will check cholesterol panel.             VTE Risk Mitigation         Ordered     enoxaparin injection 40 mg  Daily     Route:  Subcutaneous        01/14/18 1042     Low Risk of VTE  Once      01/13/18 0816          Sapna Lam MD  Cardiology  [facility]

## 2018-01-14 NOTE — PROGRESS NOTES
[facility]  Interventional Cardiology  Progress Note    Patient Name: Flo De La Torre  MRN: [id number]  Admission Date: 1/13/2018  Hospital Length of Stay: 1 days  Code Status: Full Code   Attending Physician: Lillie Fuller MD   Primary Care Physician: Primary Doctor No  Principal Problem:3-vessel CAD    Subjective:     Interval History: pt doing well, did well with PCI yesterday, step down today    Objective:     Vital Signs (Most Recent):  Temp: 98.4 °F (36.9 °C) (01/14/18 1100)  Pulse: 97 (01/14/18 1230)  Resp: (!) 25 (01/14/18 1230)  BP: 117/78 (01/14/18 1130)  SpO2: 95 % (01/14/18 1230) Vital Signs (24h Range):  Temp:  [98.4 °F (36.9 °C)-98.9 °F (37.2 °C)] 98.4 °F (36.9 °C)  Pulse:  [] 97  Resp:  [0-35] 25  SpO2:  [94 %-100 %] 95 %  BP: (115-180)/() 117/78     Weight: 96.8 kg (213 lb 8 oz)  Body mass index is 28.17 kg/m².    SpO2: 95 %  O2 Device (Oxygen Therapy): room air      Intake/Output Summary (Last 24 hours) at 01/14/18 1257  Last data filed at 01/14/18 1200   Gross per 24 hour   Intake             2892 ml   Output             2470 ml   Net              422 ml       Lines/Drains/Airways     Drain                 Suprapubic Catheter 01/13/13 1827 days                Physical Exam   Constitutional: He is oriented to person, place, and time. He appears well-developed and well-nourished.   HENT:   Head: Normocephalic and atraumatic.   Eyes: Conjunctivae are normal. Pupils are equal, round, and reactive to light.   Neck: Normal range of motion. Neck supple. No JVD present.   Cardiovascular: Normal rate, regular rhythm and normal heart sounds.    Pulmonary/Chest: Effort normal. He has no rales.   Abdominal: Soft. Bowel sounds are normal. He exhibits no distension. There is no tenderness.   Musculoskeletal: Normal range of motion. He exhibits no edema.   Neurological: He is alert and oriented to person, place, and time.   Skin: Skin is warm and dry.   Psychiatric: He has  a normal mood and affect. His behavior is normal.       Significant Labs:   CMP   Recent Labs  Lab 01/13/18  0915 01/13/18  1956 01/14/18  0354    139 138   K 4.1 4.1 4.5    106 107   CO2 25 24 22*   * 199* 153*   BUN 20 20 18   CREATININE 1.0 0.8 0.8   CALCIUM 9.6 8.4* 8.5*   PROT 8.0 6.6 6.7   ALBUMIN 4.0 3.2* 3.3*   BILITOT 0.6 0.7 0.9   ALKPHOS 73 52* 60   AST 45* 237* 286*   ALT 19 39 51*   ANIONGAP 10 9 9   ESTGFRAFRICA >60.0 >60.0 >60.0   EGFRNONAA >60.0 >60.0 >60.0       Significant Imaging: X-Ray: CXR: X-Ray Chest 1 View (CXR):   Results for orders placed or performed during the hospital encounter of 01/13/18   X-Ray Chest 1 View    Narrative    Comparison: None available    Technique: Single AP chest radiograph.    Findings: Cardiac monitoring leads overlie the chest. Cardiomediastinal silhouette appears prominent with atherosclerosis and tortuosity of the thoracic aorta.  There is prominence of the pulmonary vasculature/pulmonary vascular congestion and slight increased parenchymal attenuation suggestive of pulmonary edema.  There is bibasilar subsegmental atelectasis.  No pneumothorax.    Impression    As above.      Electronically signed by: HUGO HYDE  Date:     01/14/18  Time:    05:43      Assessment and Plan:     Patient is a 82 y.o. male presenting with:    * 3-vessel CAD    Patient is s/p multi-vessel PCI including LM, proximal LAD, mid LAD, proximal LCx, mid RCA, and distal RCA.  Please see full report in EPIC  -Ticagrelor 90mg po BID X 1 year  -EC ASA 81mg po qday  -Coreg restarted  - gentle diuresis today as pt slightly volume overload on exam        Type 2 diabetes mellitus    Cont meds        S/P drug eluting coronary stent placement    -DAPT as above  -phase 1 cardiac rehab consult        Cerebellar ataxia    -patient currently on bedrest; he reports using a cane to ambulate  -rec PT consult in AM        Dementia    -patient has poor short term memory; family is at  bedside providing reassurance to patient        Essential hypertension    -as above agree w restart beta-blocker          Peripheral arterial disease    -no evidence of CLI  -outpatient bilateral LE duplex        Dyslipidemia    -re high intensity statin        NSTEMI (non-ST elevated myocardial infarction)    See above  -request cardiac rehab consult            VTE Risk Mitigation         Ordered     enoxaparin injection 40 mg  Daily     Route:  Subcutaneous        01/14/18 1042     Low Risk of VTE  Once      01/13/18 0816          Camille Cruz MD  Interventional Cardiology  [facility]

## 2018-01-14 NOTE — ASSESSMENT & PLAN NOTE
Patient today with unstable angina that was refractory to nitroglycerin so was transferred to the ICU and started on a nitro gtt at 40 which helped with the pain but did not relieve it all the way. CTS evaluated the patient and his was deemed a poor candidate for CABG. The decision was made to take the patient to the cath lab. He was given solumedrol from prior dye allergy and loaded with brilinta. Multi-vessel PCI was performed with impella CP support with AMY to LM, proximal LAD, mid LAD, proximal LCx, mid RCA, and distal RCA.    - Ticagrelor 90mg po BID X 1 year  - EC ASA 81mg po qday  - Coreg

## 2018-01-14 NOTE — NURSING
Decreased UOP past 3hrs--20cc, 10cc, 10cc.  Oral intake poor due to nausea, medicated with IV and SL Zofran with no improvement, family at bedside encouraging oral intake, pt remains awake and alert, denies pain, Dr. Louise notified or decreased UOP, orders to follow.

## 2018-01-15 PROBLEM — I48.91 ATRIAL FIBRILLATION: Status: ACTIVE | Noted: 2018-01-15

## 2018-01-15 PROBLEM — R74.01 TRANSAMINITIS: Status: ACTIVE | Noted: 2018-01-15

## 2018-01-15 LAB
ALBUMIN SERPL BCP-MCNC: 3 G/DL
ALP SERPL-CCNC: 50 U/L
ALT SERPL W/O P-5'-P-CCNC: 37 U/L
ANION GAP SERPL CALC-SCNC: 5 MMOL/L
APTT BLDCRRT: 23.7 SEC
AST SERPL-CCNC: 106 U/L
BASOPHILS # BLD AUTO: 0 K/UL
BASOPHILS NFR BLD: 0 %
BILIRUB SERPL-MCNC: 0.8 MG/DL
BUN SERPL-MCNC: 26 MG/DL
CALCIUM SERPL-MCNC: 8.6 MG/DL
CHLORIDE SERPL-SCNC: 106 MMOL/L
CO2 SERPL-SCNC: 26 MMOL/L
CREAT SERPL-MCNC: 0.9 MG/DL
DIFFERENTIAL METHOD: ABNORMAL
EOSINOPHIL # BLD AUTO: 0 K/UL
EOSINOPHIL NFR BLD: 0.1 %
ERYTHROCYTE [DISTWIDTH] IN BLOOD BY AUTOMATED COUNT: 13.1 %
EST. GFR  (AFRICAN AMERICAN): >60 ML/MIN/1.73 M^2
EST. GFR  (NON AFRICAN AMERICAN): >60 ML/MIN/1.73 M^2
ESTIMATED PA SYSTOLIC PRESSURE: 28.52
GLUCOSE SERPL-MCNC: 154 MG/DL
HCT VFR BLD AUTO: 32.8 %
HGB BLD-MCNC: 11 G/DL
IMM GRANULOCYTES # BLD AUTO: 0.05 K/UL
IMM GRANULOCYTES NFR BLD AUTO: 0.4 %
INR PPP: 1
LYMPHOCYTES # BLD AUTO: 1 K/UL
LYMPHOCYTES NFR BLD: 7.2 %
MAGNESIUM SERPL-MCNC: 2.2 MG/DL
MCH RBC QN AUTO: 29.1 PG
MCHC RBC AUTO-ENTMCNC: 33.5 G/DL
MCV RBC AUTO: 87 FL
MITRAL VALVE MOBILITY: NORMAL
MONOCYTES # BLD AUTO: 1.1 K/UL
MONOCYTES NFR BLD: 7.7 %
NEUTROPHILS # BLD AUTO: 11.5 K/UL
NEUTROPHILS NFR BLD: 84.6 %
NRBC BLD-RTO: 0 /100 WBC
PHOSPHATE SERPL-MCNC: 2.2 MG/DL
PLATELET # BLD AUTO: 165 K/UL
PMV BLD AUTO: 9.8 FL
POC ACTIVATED CLOTTING TIME K: 169 SEC (ref 74–137)
POC ACTIVATED CLOTTING TIME K: 202 SEC (ref 74–137)
POC ACTIVATED CLOTTING TIME K: 208 SEC (ref 74–137)
POC ACTIVATED CLOTTING TIME K: 219 SEC (ref 74–137)
POC ACTIVATED CLOTTING TIME K: 235 SEC (ref 74–137)
POC ACTIVATED CLOTTING TIME K: 246 SEC (ref 74–137)
POC ACTIVATED CLOTTING TIME K: 246 SEC (ref 74–137)
POC ACTIVATED CLOTTING TIME K: 252 SEC (ref 74–137)
POCT GLUCOSE: 140 MG/DL (ref 70–110)
POCT GLUCOSE: 159 MG/DL (ref 70–110)
POCT GLUCOSE: 212 MG/DL (ref 70–110)
POTASSIUM SERPL-SCNC: 4 MMOL/L
PROT SERPL-MCNC: 6 G/DL
PROTHROMBIN TIME: 10.4 SEC
RBC # BLD AUTO: 3.78 M/UL
RETIRED EF AND QEF - SEE NOTES: 50 (ref 55–65)
SAMPLE: ABNORMAL
SODIUM SERPL-SCNC: 137 MMOL/L
WBC # BLD AUTO: 13.62 K/UL

## 2018-01-15 PROCEDURE — 63600175 PHARM REV CODE 636 W HCPCS: Performed by: STUDENT IN AN ORGANIZED HEALTH CARE EDUCATION/TRAINING PROGRAM

## 2018-01-15 PROCEDURE — 93005 ELECTROCARDIOGRAM TRACING: CPT

## 2018-01-15 PROCEDURE — 99233 SBSQ HOSP IP/OBS HIGH 50: CPT | Mod: ,,, | Performed by: NURSE PRACTITIONER

## 2018-01-15 PROCEDURE — 85730 THROMBOPLASTIN TIME PARTIAL: CPT

## 2018-01-15 PROCEDURE — 99233 SBSQ HOSP IP/OBS HIGH 50: CPT | Mod: ,,, | Performed by: INTERNAL MEDICINE

## 2018-01-15 PROCEDURE — 20600001 HC STEP DOWN PRIVATE ROOM

## 2018-01-15 PROCEDURE — G8987 SELF CARE CURRENT STATUS: HCPCS | Mod: CL

## 2018-01-15 PROCEDURE — 83735 ASSAY OF MAGNESIUM: CPT

## 2018-01-15 PROCEDURE — G8989 SELF CARE D/C STATUS: HCPCS | Mod: CL

## 2018-01-15 PROCEDURE — 25000003 PHARM REV CODE 250: Performed by: STUDENT IN AN ORGANIZED HEALTH CARE EDUCATION/TRAINING PROGRAM

## 2018-01-15 PROCEDURE — 25000003 PHARM REV CODE 250: Performed by: NURSE PRACTITIONER

## 2018-01-15 PROCEDURE — 63600175 PHARM REV CODE 636 W HCPCS: Performed by: INTERNAL MEDICINE

## 2018-01-15 PROCEDURE — 85025 COMPLETE CBC W/AUTO DIFF WBC: CPT

## 2018-01-15 PROCEDURE — 99233 SBSQ HOSP IP/OBS HIGH 50: CPT | Mod: GC,,, | Performed by: INTERNAL MEDICINE

## 2018-01-15 PROCEDURE — 84100 ASSAY OF PHOSPHORUS: CPT

## 2018-01-15 PROCEDURE — 93306 TTE W/DOPPLER COMPLETE: CPT | Mod: 26,,, | Performed by: INTERNAL MEDICINE

## 2018-01-15 PROCEDURE — 93010 ELECTROCARDIOGRAM REPORT: CPT | Mod: ,,, | Performed by: INTERNAL MEDICINE

## 2018-01-15 PROCEDURE — 80053 COMPREHEN METABOLIC PANEL: CPT

## 2018-01-15 PROCEDURE — 93306 TTE W/DOPPLER COMPLETE: CPT

## 2018-01-15 PROCEDURE — 25000003 PHARM REV CODE 250: Performed by: INTERNAL MEDICINE

## 2018-01-15 PROCEDURE — 86580 TB INTRADERMAL TEST: CPT | Performed by: NURSE PRACTITIONER

## 2018-01-15 PROCEDURE — 97166 OT EVAL MOD COMPLEX 45 MIN: CPT

## 2018-01-15 PROCEDURE — 97162 PT EVAL MOD COMPLEX 30 MIN: CPT

## 2018-01-15 PROCEDURE — 63600175 PHARM REV CODE 636 W HCPCS: Performed by: NURSE PRACTITIONER

## 2018-01-15 PROCEDURE — G8988 SELF CARE GOAL STATUS: HCPCS | Mod: CK

## 2018-01-15 PROCEDURE — 85610 PROTHROMBIN TIME: CPT

## 2018-01-15 RX ORDER — SODIUM,POTASSIUM PHOSPHATES 280-250MG
2 POWDER IN PACKET (EA) ORAL
Status: COMPLETED | OUTPATIENT
Start: 2018-01-15 | End: 2018-01-15

## 2018-01-15 RX ORDER — SYRING-NEEDL,DISP,INSUL,0.3 ML 29 G X1/2"
296 SYRINGE, EMPTY DISPOSABLE MISCELLANEOUS ONCE
Status: COMPLETED | OUTPATIENT
Start: 2018-01-15 | End: 2018-01-15

## 2018-01-15 RX ORDER — CARVEDILOL 6.25 MG/1
12.5 TABLET ORAL 2 TIMES DAILY
Status: DISCONTINUED | OUTPATIENT
Start: 2018-01-15 | End: 2018-01-16 | Stop reason: HOSPADM

## 2018-01-15 RX ORDER — SODIUM,POTASSIUM PHOSPHATES 280-250MG
2 POWDER IN PACKET (EA) ORAL ONCE
Status: COMPLETED | OUTPATIENT
Start: 2018-01-15 | End: 2018-01-15

## 2018-01-15 RX ORDER — RAMELTEON 8 MG/1
8 TABLET ORAL NIGHTLY PRN
Status: DISCONTINUED | OUTPATIENT
Start: 2018-01-15 | End: 2018-01-16 | Stop reason: HOSPADM

## 2018-01-15 RX ADMIN — ENOXAPARIN SODIUM 40 MG: 100 INJECTION SUBCUTANEOUS at 04:01

## 2018-01-15 RX ADMIN — Medication 5 UNITS: at 12:01

## 2018-01-15 RX ADMIN — ROSUVASTATIN CALCIUM 20 MG: 20 TABLET, FILM COATED ORAL at 08:01

## 2018-01-15 RX ADMIN — ONDANSETRON 4 MG: 2 INJECTION INTRAMUSCULAR; INTRAVENOUS at 07:01

## 2018-01-15 RX ADMIN — SODIUM CHLORIDE 250 ML: 900 INJECTION, SOLUTION INTRAVENOUS at 01:01

## 2018-01-15 RX ADMIN — POLYETHYLENE GLYCOL 3350 17 G: 17 POWDER, FOR SOLUTION ORAL at 08:01

## 2018-01-15 RX ADMIN — TICAGRELOR 90 MG: 90 TABLET ORAL at 08:01

## 2018-01-15 RX ADMIN — DRONABINOL 2.5 MG: 2.5 CAPSULE ORAL at 08:01

## 2018-01-15 RX ADMIN — AMLODIPINE BESYLATE 5 MG: 5 TABLET ORAL at 08:01

## 2018-01-15 RX ADMIN — LISINOPRIL 20 MG: 20 TABLET ORAL at 08:01

## 2018-01-15 RX ADMIN — POTASSIUM & SODIUM PHOSPHATES POWDER PACK 280-160-250 MG 2 PACKET: 280-160-250 PACK at 08:01

## 2018-01-15 RX ADMIN — CARVEDILOL 25 MG: 25 TABLET, FILM COATED ORAL at 08:01

## 2018-01-15 RX ADMIN — ASPIRIN 81 MG: 81 TABLET, COATED ORAL at 08:01

## 2018-01-15 RX ADMIN — ONDANSETRON 4 MG: 2 INJECTION INTRAMUSCULAR; INTRAVENOUS at 05:01

## 2018-01-15 RX ADMIN — POTASSIUM & SODIUM PHOSPHATES POWDER PACK 280-160-250 MG 2 PACKET: 280-160-250 PACK at 12:01

## 2018-01-15 RX ADMIN — ONDANSETRON 4 MG: 4 TABLET, ORALLY DISINTEGRATING ORAL at 12:01

## 2018-01-15 RX ADMIN — INSULIN ASPART 1 UNITS: 100 INJECTION, SOLUTION INTRAVENOUS; SUBCUTANEOUS at 05:01

## 2018-01-15 RX ADMIN — POTASSIUM & SODIUM PHOSPHATES POWDER PACK 280-160-250 MG 2 PACKET: 280-160-250 PACK at 04:01

## 2018-01-15 RX ADMIN — CARVEDILOL 12.5 MG: 6.25 TABLET, FILM COATED ORAL at 08:01

## 2018-01-15 RX ADMIN — MAGESIUM CITRATE 296 ML: 1.75 LIQUID ORAL at 10:01

## 2018-01-15 NOTE — PLAN OF CARE
01/15/18 1020   Discharge Assessment   Assessment Type Discharge Planning Assessment   Confirmed/corrected address and phone number on facesheet? Yes   Assessment information obtained from? Caregiver;Medical Record   Expected Length of Stay (days) 4   Communicated expected length of stay with patient/caregiver yes   Prior to hospitilization cognitive status: Alert/Oriented   Prior to hospitalization functional status: Assistive Equipment;Needs Assistance   Current cognitive status: Alert/Oriented   Current Functional Status: Partially Dependent   Facility Arrived From: [facility]   Lives With spouse   Able to Return to Prior Arrangements unable to determine at this time (comments)   Is patient able to care for self after discharge? Unable to determine at this time (comments)   Patient's perception of discharge disposition acute care hospital   Readmission Within The Last 30 Days no previous admission in last 30 days   Patient currently being followed by outpatient case management? No   Patient currently receives any other outside agency services? Yes   Name and contact number of agency or person providing outside services [facility]   Is it the patient/care giver preference to resume care with the current outside agency? Yes   Equipment Currently Used at Home cane, straight;rollator;power chair;raised toilet   Do you have any problems affording any of your prescribed medications? No   Is the patient taking medications as prescribed? yes   Does the patient have transportation home? Yes   Transportation Available family or friend will provide   Does the patient receive services at the [facility]? No   Discharge Plan A Skilled Nursing Facility   Discharge Plan B Home with family;Home Health   Patient/Family In Agreement With Plan yes   Transferred from [facility] in [place] for MV Cad. Pt lives with wife and previously needed assist with ADLs. No has declined on this admit. Pt had HHC per [facility] but may  need SNF upon DC. Family requestind [facility].

## 2018-01-15 NOTE — PLAN OF CARE
met with pt and pts daughter at the bedside.  Pt is an alert gentleman who lives with his wife in [place]  Family reports pt will go to [facility] in [place], [place]  sw attempted to call in [facility] however the state office is closed die to the  Holiday.  Will make referral to the above nursing home via [facility].

## 2018-01-15 NOTE — ASSESSMENT & PLAN NOTE
AST> 2 x ALT; etiology of transaminitis is unclear at this time, but AST is decreasing;  -consider folate and thiamine supplementation or multi-vitamin

## 2018-01-15 NOTE — PROGRESS NOTES
[facility]  Cardiology  Progress Note    Patient Name: Flo De La Torre  MRN: [id number]  Admission Date: 1/13/2018  Hospital Length of Stay: 2 days  Code Status: Full Code   Attending Physician: Kassidy aLm MD   Primary Care Physician: Paulo Fortune Jr, MD  Expected Discharge Date: 1/17/2018  Principal Problem:3-vessel CAD    Subjective:     Hospital Course:   Patient is s/p multi-vessel PCI including LM, proximal LAD, mid LAD, proximal LCx, mid RCA, and distal RCA.   Patient stable for step down to hospital medicine.   -01/15: afib remains. Risk Benefit discussion with family regarding fall and bleeding risk vs anticoagulation     Interval History: Patient remains in atrial fibrillation though rate controlled ~90 BMP. Discussion was had regarding his history of falls and the setting of what is now a need for anticoagulation for new onset Afib. Patients family has made the decision that the risk of falling with resulting bleed would be the most intolerable outcome, as such, they have decided not to move forward with anticoagulation at this time. Will reevaluate on an outpatient basis the need for initiating anticoagulation versus outpatient procedure for Afib such as L atrial appendage removal.     Review of Systems   All other systems reviewed and are negative.    Objective:     Vital Signs (Most Recent):  Temp: 97.9 °F (36.6 °C) (01/15/18 1220)  Pulse: 95 (01/15/18 1500)  Resp: 20 (01/15/18 1220)  BP: (!) 108/55 (01/15/18 1500)  SpO2: (!) 94 % (01/15/18 1220) Vital Signs (24h Range):  Temp:  [97.9 °F (36.6 °C)-98.4 °F (36.9 °C)] 97.9 °F (36.6 °C)  Pulse:  [] 95  Resp:  [8-35] 20  SpO2:  [93 %-97 %] 94 %  BP: ()/(51-86) 108/55     Weight: 99.5 kg (219 lb 5.7 oz)  Body mass index is 28.94 kg/m².     SpO2: (!) 94 %  O2 Device (Oxygen Therapy): room air    Intake/Output Summary (Last 24 hours) at 01/15/18 1600  Last data filed at 01/15/18 0800   Gross per 24 hour   Intake                 0 ml   Output             1080 ml   Net            -1080 ml       Lines/Drains/Airways     Drain                 Suprapubic Catheter 01/13/13 1828 days          Peripheral Intravenous Line                 Peripheral IV - Single Lumen 01/15/18 1525 Left Forearm less than 1 day                Physical Exam   Constitutional: He appears well-developed and well-nourished.   HENT:   Head: Normocephalic and atraumatic.   Neck: Normal range of motion. Neck supple. No JVD present.   Cardiovascular: Normal rate, regular rhythm and normal heart sounds.  Exam reveals no gallop and no friction rub.    No murmur heard.  Pulses:       Femoral pulses are 2+ on the right side, and 2+ on the left side.  Pulmonary/Chest: Effort normal and breath sounds normal. He has no rales.   Abdominal: Soft. Bowel sounds are normal.   Musculoskeletal: Normal range of motion. He exhibits edema (mild BLE).   Neurological: He is alert.   Skin: Skin is warm and dry.     Significant Labs:   Recent Results (from the past 24 hour(s))   Basic metabolic panel    Collection Time: 01/14/18  7:29 PM   Result Value Ref Range    Sodium 137 136 - 145 mmol/L    Potassium 3.6 3.5 - 5.1 mmol/L    Chloride 105 95 - 110 mmol/L    CO2 25 23 - 29 mmol/L    Glucose 157 (H) 70 - 110 mg/dL    BUN, Bld 24 (H) 8 - 23 mg/dL    Creatinine 0.9 0.5 - 1.4 mg/dL    Calcium 8.6 (L) 8.7 - 10.5 mg/dL    Anion Gap 7 (L) 8 - 16 mmol/L    eGFR if African American >60.0 >60 mL/min/1.73 m^2    eGFR if non African American >60.0 >60 mL/min/1.73 m^2   Magnesium    Collection Time: 01/14/18  7:29 PM   Result Value Ref Range    Magnesium 2.1 1.6 - 2.6 mg/dL   Phosphorus    Collection Time: 01/14/18  7:29 PM   Result Value Ref Range    Phosphorus 2.5 (L) 2.7 - 4.5 mg/dL   POCT glucose    Collection Time: 01/15/18  1:15 AM   Result Value Ref Range    POCT Glucose 140 (H) 70 - 110 mg/dL   Comprehensive metabolic panel    Collection Time: 01/15/18  4:26 AM   Result Value Ref Range     Sodium 137 136 - 145 mmol/L    Potassium 4.0 3.5 - 5.1 mmol/L    Chloride 106 95 - 110 mmol/L    CO2 26 23 - 29 mmol/L    Glucose 154 (H) 70 - 110 mg/dL    BUN, Bld 26 (H) 8 - 23 mg/dL    Creatinine 0.9 0.5 - 1.4 mg/dL    Calcium 8.6 (L) 8.7 - 10.5 mg/dL    Total Protein 6.0 6.0 - 8.4 g/dL    Albumin 3.0 (L) 3.5 - 5.2 g/dL    Total Bilirubin 0.8 0.1 - 1.0 mg/dL    Alkaline Phosphatase 50 (L) 55 - 135 U/L     (H) 10 - 40 U/L    ALT 37 10 - 44 U/L    Anion Gap 5 (L) 8 - 16 mmol/L    eGFR if African American >60.0 >60 mL/min/1.73 m^2    eGFR if non African American >60.0 >60 mL/min/1.73 m^2   CBC auto differential    Collection Time: 01/15/18  4:26 AM   Result Value Ref Range    WBC 13.62 (H) 3.90 - 12.70 K/uL    RBC 3.78 (L) 4.60 - 6.20 M/uL    Hemoglobin 11.0 (L) 14.0 - 18.0 g/dL    Hematocrit 32.8 (L) 40.0 - 54.0 %    MCV 87 82 - 98 fL    MCH 29.1 27.0 - 31.0 pg    MCHC 33.5 32.0 - 36.0 g/dL    RDW 13.1 11.5 - 14.5 %    Platelets 165 150 - 350 K/uL    MPV 9.8 9.2 - 12.9 fL    Immature Granulocytes 0.4 0.0 - 0.5 %    Gran # 11.5 (H) 1.8 - 7.7 K/uL    Immature Grans (Abs) 0.05 (H) 0.00 - 0.04 K/uL    Lymph # 1.0 1.0 - 4.8 K/uL    Mono # 1.1 (H) 0.3 - 1.0 K/uL    Eos # 0.0 0.0 - 0.5 K/uL    Baso # 0.00 0.00 - 0.20 K/uL    nRBC 0 0 /100 WBC    Gran% 84.6 (H) 38.0 - 73.0 %    Lymph% 7.2 (L) 18.0 - 48.0 %    Mono% 7.7 4.0 - 15.0 %    Eosinophil% 0.1 0.0 - 8.0 %    Basophil% 0.0 0.0 - 1.9 %    Differential Method Automated    Magnesium    Collection Time: 01/15/18  4:26 AM   Result Value Ref Range    Magnesium 2.2 1.6 - 2.6 mg/dL   Phosphorus    Collection Time: 01/15/18  4:26 AM   Result Value Ref Range    Phosphorus 2.2 (L) 2.7 - 4.5 mg/dL   Protime-INR    Collection Time: 01/15/18  4:26 AM   Result Value Ref Range    Prothrombin Time 10.4 9.0 - 12.5 sec    INR 1.0 0.8 - 1.2   APTT    Collection Time: 01/15/18  4:26 AM   Result Value Ref Range    aPTT 23.7 21.0 - 32.0 sec   POCT glucose    Collection Time:  01/15/18  5:29 AM   Result Value Ref Range    POCT Glucose 159 (H) 70 - 110 mg/dL   2D echo with color flow doppler    Collection Time: 01/15/18  8:00 AM   Result Value Ref Range    EF 50 55 - 65    Est. PA Systolic Pressure 28.52     Mitral Valve Mobility NORMAL      Significant Imaging:   CXR 01/13/2018:  Findings: Cardiac monitoring leads overlie the chest. Cardiomediastinal silhouette appears prominent with atherosclerosis and tortuosity of the thoracic aorta.  There is prominence of the pulmonary vasculature/pulmonary vascular congestion and slight increased parenchymal attenuation suggestive of pulmonary edema.  There is bibasilar subsegmental atelectasis.  No pneumothorax.    Echo w/ doppler 01/15/2018:  CONCLUSIONS     1 - Mildly depressed left ventricular systolic function (EF 50-55%).     2 - Wall motion abnormalities.     3 - Indeterminate LV diastolic function.     4 - The estimated PA systolic pressure is 32 mmHg.     Assessment and Plan:     Brief HPI: Pt is a 83 y/o male w hx of mild dementia, hypertension, hyperlipidemia, severe three vessel CAD with prior PCTA in [place], HTN, HLD, T2DM, BPH, carotid artery Stenosis, and TIA who presented to OSH with unstable angina despite beta blocker, nitrate, and 4 baby aspirin. States CP lasted 10-15 min, radiated down arm. SOB week prior to episode. Woke up patient from sleep. While there had a LHC with  distal LMCA 90%, ostial LAD 90%, ostial LCx 90%, mid RCA 95%, and EF 55%. He was initially on nitro gtt there but on transfer that was stopped and he was on the floor here. Today he had chest pain that was refractory to sublingual nitro so cardiology was consulted. On evaluation of the patient EKG with diffuse ST depressions in inferior, anterior, and lateral leads. He was transferred to the ICU and started on nitroglycerin gtt. Which helped a bit with the chest pain but did not relieve it all the way.      Dx with stable angina 2 weeks prior, started on beta-  blockade. Prior LHC in 2002 showed severe diffuse CAD with  of RCA collateralized and preserved LV fxn. Patient interactive, issues with short term memory. Able to care perform ADL's.      Echo 1/12/17: disc did not work but states EF is 75%     Functionally: shuffling walk, able to walk 1/2 mile per daughter with a cane    * 3-vessel CAD    Patient today with unstable angina that was refractory to nitroglycerin so was transferred to the ICU and started on a nitro gtt at 40 which helped with the pain but did not relieve it all the way. CTS evaluated the patient and his was deemed a poor candidate for CABG. The decision was made to take the patient to the cath lab. He was given solumedrol from prior dye allergy and loaded with brilinta. Multi-vessel PCI was performed with impella CP support with AMY to LM, proximal LAD, mid LAD, proximal LCx, mid RCA, and distal RCA.  -patients family making the decision to hold anticoagulation at this time 2/2 risk of bleed. This decision was discussed with Dr. Soriano.  CHADsVASC score of 7 imparts 11.2% annual risk of thromboembolic event    - Ticagrelor 90mg po BID X 1 year would be another option if patients family decides for anticoagulation   - EC ASA 81mg po qday  - Coreg 12.5mg po BID       Type 2 diabetes mellitus    Will start on low dose sliding scale for now.       Cerebellar ataxia    Physical therapy to evaluate       Essential hypertension    Blood pressure meds restarted. Lisinopril 20, Coreg 12.5  -Discontinued Norvasc 5  -Continue home coreg today      Peripheral arterial disease    Not very active but does not complain of any pain at present and no non healing ulcers at this time. Can further evaluate as an outpatient.       Dyslipidemia    High dose statin therapy.   Will check cholesterol panel.         VTE Risk Mitigation         Ordered     enoxaparin injection 40 mg  Daily     Route:  Subcutaneous        01/14/18 1042     Low Risk of VTE  Once      01/13/18  7352        Popeye Arshad MD  Cardiology  [facility]

## 2018-01-15 NOTE — PLAN OF CARE
Problem: Occupational Therapy Goal  Goal: Occupational Therapy Goal  Goals to be met by: 7 days (1/22/18)     Patient will increase functional independence with ADLs by performing:    UE Dressing with Stand-by Assistance.  LE Dressing with Minimal Assistance.  Grooming while EOB with Stand-by Assistance.  Toileting from bedside commode with Minimal Assistance for hygiene and clothing management.   Supine to sit with Contact Guard Assistance.  Toilet transfer to toilet with Contact Guard Assistance.  Pt will perform functional mobility household distance with CGA using AD as needed.    Outcome: Ongoing (interventions implemented as appropriate)  Eval and POC set 1/15/18

## 2018-01-15 NOTE — PT/OT/SLP EVAL
Physical Therapy Evaluation    Patient Name:  Flo De La Torre   MRN:  [id number]    Recommendations:     Discharge Recommendations:  nursing facility, skilled   Discharge Equipment Recommendations: other (see comments) (TBD)   Barriers to discharge: Decreased caregiver support and pt requires increased assist at this time    Assessment:     Flo De La Torre is a 82 y.o. male admitted with a medical diagnosis of 3-vessel CAD.  He presents with the following impairments/functional limitations:  weakness, gait instability, decreased lower extremity function, impaired balance, impaired endurance, impaired coordination, decreased safety awareness, impaired cardiopulmonary response to activity, impaired functional mobilty, decreased coordination. Pt performed bed mobility min A and transfers min A with RW. Pt took 4 side steps EOB with seated rest break then 4 steps to bedside chair, increased flex posture with shuffling and decreased B LE clearance; pt amb limited 2* nausea and dizziness. Pt will continue to benefit from skilled PT to improve deficits and increase overall functional mobility.     Rehab Prognosis:  Good; patient would benefit from acute skilled PT services to address these deficits and reach maximum level of function.      Recent Surgery: Procedure(s) (LRB):  Coronary angiography (N/A)      Plan:     During this hospitalization, patient to be seen 5 x/week to address the above listed problems via gait training, therapeutic activities, therapeutic exercises, neuromuscular re-education  · Plan of Care Expires:  02/15/18   Plan of Care Reviewed with: patient, daughter    Subjective     Communicated with RN prior to session.  Patient found supine in bed and family present upon PT entry to room, agreeable to evaluation.      Chief Complaint: nausea and dizziness  Patient comments/goals: return to PLOF  Pain/Comfort:  · Pain Rating 1: 0/10  · Pain Rating Post-Intervention 1: 0/10    Living  Environment:  Pt lives with wife in 1-story house with threshold [place] and 3 steps to bedroom with B handrails.  Pt reports amb with SC and (I) with ADLs. Pt daughter present and reports that pt wife is unable to physically assist.   Prior to admission, patients level of function was mod (I).  Patient has the following equipment: cane, straight, rollator, power chair, grab bar, raised toilet, walker, rolling.  DME owned (not currently used): none.  Upon discharge, patient will have assistance from family.    Objective:     Patient found with: telemetry, richardson catheter     General Precautions: Standard, fall   Orthopedic Precautions:N/A   Braces: N/A     Exams:  · Cognitive Exam:  Patient is oriented to Person, Place, Time and Situation and follows 100% of 1-step commands   · Gross Motor Coordination:  WFL  · Postural Exam:  Patient presented with the following abnormalities:    · -       Rounded shoulders  · -       Forward head  · Sensation:    · -       Intact  light/touch B LE  · Skin Integrity/Edema:      · -       Skin integrity: Visible skin intact  · RLE ROM: WFL  · RLE Strength: WFL  · LLE ROM: WFL  · LLE Strength: WFL    Functional Mobility:  Bed Mobility:     · Supine to Sit: minimum assistance    Transfers:     · Sit to Stand:  minimum assistance with rolling walker; from EOB x2 trials    Gait: 4 side steps EOB with seated rest break then 4 steps to bedside chair, increased flex posture with shuffling and decreased B LE clearance; pt amb limited 2* nausea and dizziness.     AM-PAC 6 CLICK MOBILITY  Total Score:17       Therapeutic Activities and Exercises:  Pt and family educated on:  -role of PT/POC  -safety with transfers and amb with RW  -importance of OOB activity  Pt safe to perform transfers with RN staff with RW.     Patient left up in chair with all lines intact, call button in reach, RN notified and daughter present.    GOALS:    Physical Therapy Goals        Problem: Physical Therapy Goal    Goal  Priority Disciplines Outcome Goal Variances Interventions   Physical Therapy Goal     PT/OT, PT Ongoing (interventions implemented as appropriate)     Description:  Goals to be met by: 2018     Patient will increase functional independence with mobility by performin. Supine to sit with Stand-by Assistance  2. Sit to supine with MInimal Assistance  3. Sit to stand transfer with Stand-by Assistance  4. Gait  x 100 feet with Contact Guard Assistance using Rolling Walker.   5. Lower extremity exercise program x15 reps per handout, with supervision                      History:     Past Medical History:   Diagnosis Date    3-vessel CAD     Alzheimer disease     Ataxia     BPH (benign prostatic hyperplasia)     Carotid stenosis     Cerebellar ataxia     Chronic nausea     HLD (hyperlipidemia)     HTN (hypertension)     TIA (transient ischemic attack)     Type 2 diabetes mellitus        Past Surgical History:   Procedure Laterality Date    APPENDECTOMY      CARDIAC CATHETERIZATION      malignant melanoma          Clinical Decision Making:     History  Co-morbidities and personal factors that may impact the plan of care Examination  Body Structures and Functions, activity limitations and participation restrictions that may impact the plan of care Clinical Presentation   Decision Making/ Complexity Score   Co-morbidities:   [x] Time since onset of injury / illness / exacerbation  [x] Status of current condition  [x]Patient's cognitive status and safety concerns    [x] Multiple Medical Problems (see med hx)  Personal Factors:   [x] Patient's age  [x] Prior Level of function   [x] Patient's home situation (environment and family support)  [] Patient's level of motivation  [] Expected progression of patient      HISTORY:(criteria)    [] 96196 - no personal factors/history    [] 92163 - has 1-2 personal factor/comorbidity     [x] 02947 - has >3 personal factor/comorbidity     Body Regions:  [x]  Objective examination findings  [x] Head     []  Neck  [x] Trunk   [] Upper Extremity  [x] Lower Extremity    Body Systems:  [x] For communication ability, affect, cognition, language, and learning style: the assessment of the ability to make needs known, consciousness, orientation (person, place, and time), expected emotional /behavioral responses, and learning preferences (eg, learning barriers, education  needs)  [x] For the neuromuscular system: a general assessment of gross coordinated movement (eg, balance, gait, locomotion, transfers, and transitions) and motor function  (motor control and motor learning)  [x] For the musculoskeletal system: the assessment of gross symmetry, gross range of motion, gross strength, height, and weight  [x] For the integumentary system: the assessment of pliability(texture), presence of scar formation, skin color, and skin integrity  [x] For cardiovascular/pulmonary system: the assessment of heart rate, respiratory rate, blood pressure, and edema     Activity limitations:    [x] Patient's cognitive status and saf ety concerns          [x] Status of current condition      [] Weight bearing restriction  [x] Cardiopulmunary Restriction    Participation Restrictions:   [] Goals and goal agreement with the patient     [] Rehab potential (prognosis) and probable outcome      Examination of Body System: (criteria)    [] 31639 - addressing 1-2 elements    [] 92933 - addressing a total of 3 or more elements     [x] 78174 -  Addressing a total of 4 or more elements         Clinical Presentation: (criteria)  Evolving - 78923     On examination of body system using standardized tests and measures patient presents with 3 or more elements from any of the following: body structures and functions, activity limitations, and/or participation restrictions.  Leading to a clinical presentation that is considered evolving with changing characteristics                              Clinical Decision  Making  (Eval Complexity):  Moderate - 69104     Time Tracking:     PT Received On: 01/15/18  PT Start Time: 0928     PT Stop Time: 0948  PT Total Time (min): 20 min     Billable Minutes: Evaluation 20      ZENON WIN, PT  01/15/2018

## 2018-01-15 NOTE — ASSESSMENT & PLAN NOTE
Patient was noted to be in atrial fibrillation on telemetry.  He appears to be asymptomatic and had awith a ventricular rate in 80's at the time of my exam  CHADS2-VASC score is at least 5. HAS-BLED score=2, but patient and his family report a history of falls.  I had a detailed discussion regarding atrial fibrillation and stroke risk with the patient and his family. I discussed the reduction in stroke with anti-coagulation as well as the risk of bleeding with an anti-coagulant such as coumadin as well as the increased risk with coumadin plus DAPT.  I also discussed the potential possibility of ZEB occlusion in the future.  The patient and hsi family opted for no anti-coagulation for Atrial fibrillation for now and will discuss ZEB occlusion in the future

## 2018-01-15 NOTE — ASSESSMENT & PLAN NOTE
Patient is s/p multi-vessel PCI including LM, proximal LAD, mid LAD, proximal LCx, mid RCA, and distal RCA.  Please see full report in EPIC  -Ticagrelor 90mg po BID X 1 year  -EC ASA 81mg po qday  -agree with Coreg  -please order cardiac rehab consult

## 2018-01-15 NOTE — PROGRESS NOTES
Pt admitted to SICU 6070 from cardiac cath. Pt hooked up to ICU cardiac, SpO2, and BP monitors. Pt awake, following commands, but not oriented. Right groin venous sheath in place. Left groin site soft, non tender. Dressing in place. Small amount of sanguinous drainage on dressing. Pulse palpable in extremites x 4. VSS. Skin free from breakdown.

## 2018-01-15 NOTE — NURSING TRANSFER
Nursing Transfer Note      1/15/2018     Transfer TO: 326 FROM: SICU 6070    Transfer via bed    Transfer with  to O2, cardiac monitoring    Transported by Ariella Moreno RN     Medicines sent: yes    Chart send with patient: Yes    Notified: spouse, daughter    Patient reassessed at: 1/15/17, 0510    Upon arrival to floor: cardiac monitor applied, patient oriented to room, call bell in reach and bed in lowest position

## 2018-01-15 NOTE — ASSESSMENT & PLAN NOTE
-patient had symptomatic hypotension this AM  -rec stop Norvasc  -continue Coreg  -continue ACE-I

## 2018-01-15 NOTE — SUBJECTIVE & OBJECTIVE
Interval History:     Review of Systems   All other systems reviewed and are negative.    Objective:     Vital Signs (Most Recent):  Temp: 97.9 °F (36.6 °C) (01/15/18 1220)  Pulse: 95 (01/15/18 1500)  Resp: 20 (01/15/18 1220)  BP: (!) 108/55 (01/15/18 1500)  SpO2: (!) 94 % (01/15/18 1220) Vital Signs (24h Range):  Temp:  [97.9 °F (36.6 °C)-98.4 °F (36.9 °C)] 97.9 °F (36.6 °C)  Pulse:  [] 95  Resp:  [8-35] 20  SpO2:  [93 %-97 %] 94 %  BP: ()/(51-86) 108/55     Weight: 99.5 kg (219 lb 5.7 oz)  Body mass index is 28.94 kg/m².     SpO2: (!) 94 %  O2 Device (Oxygen Therapy): room air      Intake/Output Summary (Last 24 hours) at 01/15/18 1600  Last data filed at 01/15/18 0800   Gross per 24 hour   Intake                0 ml   Output             1080 ml   Net            -1080 ml       Lines/Drains/Airways     Drain                 Suprapubic Catheter 01/13/13 1828 days          Peripheral Intravenous Line                 Peripheral IV - Single Lumen 01/15/18 1525 Left Forearm less than 1 day                Physical Exam   Constitutional: He appears well-developed and well-nourished.   HENT:   Head: Normocephalic and atraumatic.   Neck: Normal range of motion. Neck supple. No JVD present.   Cardiovascular: Normal rate, regular rhythm and normal heart sounds.  Exam reveals no gallop and no friction rub.    No murmur heard.  Pulses:       Femoral pulses are 2+ on the right side, and 2+ on the left side.  Pulmonary/Chest: Effort normal and breath sounds normal. He has no rales.   Abdominal: Soft. Bowel sounds are normal.   Musculoskeletal: Normal range of motion. He exhibits edema (mild BLE).   Neurological: He is alert.   Skin: Skin is warm and dry.     Significant Labs:   Recent Results (from the past 24 hour(s))   Basic metabolic panel    Collection Time: 01/14/18  7:29 PM   Result Value Ref Range    Sodium 137 136 - 145 mmol/L    Potassium 3.6 3.5 - 5.1 mmol/L    Chloride 105 95 - 110 mmol/L    CO2 25 23 - 29  mmol/L    Glucose 157 (H) 70 - 110 mg/dL    BUN, Bld 24 (H) 8 - 23 mg/dL    Creatinine 0.9 0.5 - 1.4 mg/dL    Calcium 8.6 (L) 8.7 - 10.5 mg/dL    Anion Gap 7 (L) 8 - 16 mmol/L    eGFR if African American >60.0 >60 mL/min/1.73 m^2    eGFR if non African American >60.0 >60 mL/min/1.73 m^2   Magnesium    Collection Time: 01/14/18  7:29 PM   Result Value Ref Range    Magnesium 2.1 1.6 - 2.6 mg/dL   Phosphorus    Collection Time: 01/14/18  7:29 PM   Result Value Ref Range    Phosphorus 2.5 (L) 2.7 - 4.5 mg/dL   POCT glucose    Collection Time: 01/15/18  1:15 AM   Result Value Ref Range    POCT Glucose 140 (H) 70 - 110 mg/dL   Comprehensive metabolic panel    Collection Time: 01/15/18  4:26 AM   Result Value Ref Range    Sodium 137 136 - 145 mmol/L    Potassium 4.0 3.5 - 5.1 mmol/L    Chloride 106 95 - 110 mmol/L    CO2 26 23 - 29 mmol/L    Glucose 154 (H) 70 - 110 mg/dL    BUN, Bld 26 (H) 8 - 23 mg/dL    Creatinine 0.9 0.5 - 1.4 mg/dL    Calcium 8.6 (L) 8.7 - 10.5 mg/dL    Total Protein 6.0 6.0 - 8.4 g/dL    Albumin 3.0 (L) 3.5 - 5.2 g/dL    Total Bilirubin 0.8 0.1 - 1.0 mg/dL    Alkaline Phosphatase 50 (L) 55 - 135 U/L     (H) 10 - 40 U/L    ALT 37 10 - 44 U/L    Anion Gap 5 (L) 8 - 16 mmol/L    eGFR if African American >60.0 >60 mL/min/1.73 m^2    eGFR if non African American >60.0 >60 mL/min/1.73 m^2   CBC auto differential    Collection Time: 01/15/18  4:26 AM   Result Value Ref Range    WBC 13.62 (H) 3.90 - 12.70 K/uL    RBC 3.78 (L) 4.60 - 6.20 M/uL    Hemoglobin 11.0 (L) 14.0 - 18.0 g/dL    Hematocrit 32.8 (L) 40.0 - 54.0 %    MCV 87 82 - 98 fL    MCH 29.1 27.0 - 31.0 pg    MCHC 33.5 32.0 - 36.0 g/dL    RDW 13.1 11.5 - 14.5 %    Platelets 165 150 - 350 K/uL    MPV 9.8 9.2 - 12.9 fL    Immature Granulocytes 0.4 0.0 - 0.5 %    Gran # 11.5 (H) 1.8 - 7.7 K/uL    Immature Grans (Abs) 0.05 (H) 0.00 - 0.04 K/uL    Lymph # 1.0 1.0 - 4.8 K/uL    Mono # 1.1 (H) 0.3 - 1.0 K/uL    Eos # 0.0 0.0 - 0.5 K/uL    Baso #  0.00 0.00 - 0.20 K/uL    nRBC 0 0 /100 WBC    Gran% 84.6 (H) 38.0 - 73.0 %    Lymph% 7.2 (L) 18.0 - 48.0 %    Mono% 7.7 4.0 - 15.0 %    Eosinophil% 0.1 0.0 - 8.0 %    Basophil% 0.0 0.0 - 1.9 %    Differential Method Automated    Magnesium    Collection Time: 01/15/18  4:26 AM   Result Value Ref Range    Magnesium 2.2 1.6 - 2.6 mg/dL   Phosphorus    Collection Time: 01/15/18  4:26 AM   Result Value Ref Range    Phosphorus 2.2 (L) 2.7 - 4.5 mg/dL   Protime-INR    Collection Time: 01/15/18  4:26 AM   Result Value Ref Range    Prothrombin Time 10.4 9.0 - 12.5 sec    INR 1.0 0.8 - 1.2   APTT    Collection Time: 01/15/18  4:26 AM   Result Value Ref Range    aPTT 23.7 21.0 - 32.0 sec   POCT glucose    Collection Time: 01/15/18  5:29 AM   Result Value Ref Range    POCT Glucose 159 (H) 70 - 110 mg/dL   2D echo with color flow doppler    Collection Time: 01/15/18  8:00 AM   Result Value Ref Range    EF 50 55 - 65    Est. PA Systolic Pressure 28.52     Mitral Valve Mobility NORMAL      Significant Imaging:   CXR 01/13/2018:  Findings: Cardiac monitoring leads overlie the chest. Cardiomediastinal silhouette appears prominent with atherosclerosis and tortuosity of the thoracic aorta.  There is prominence of the pulmonary vasculature/pulmonary vascular congestion and slight increased parenchymal attenuation suggestive of pulmonary edema.  There is bibasilar subsegmental atelectasis.  No pneumothorax.

## 2018-01-15 NOTE — SUBJECTIVE & OBJECTIVE
Interval History: Patient denies angina or dyspnea.  No access site pain.    Objective:     Vital Signs (Most Recent):  Temp: 98.9 °F (37.2 °C) (01/15/18 1611)  Pulse: 79 (01/15/18 1611)  Resp: 18 (01/15/18 1611)  BP: (!) 119/58 (01/15/18 1611)  SpO2: (!) 94 % (01/15/18 1611) Vital Signs (24h Range):  Temp:  [97.9 °F (36.6 °C)-98.9 °F (37.2 °C)] 98.9 °F (37.2 °C)  Pulse:  [] 79  Resp:  [8-35] 18  SpO2:  [93 %-97 %] 94 %  BP: ()/(51-86) 119/58     Weight: 99.5 kg (219 lb 5.7 oz)  Body mass index is 28.94 kg/m².    SpO2: (!) 94 %  O2 Device (Oxygen Therapy): room air      Intake/Output Summary (Last 24 hours) at 01/15/18 1704  Last data filed at 01/15/18 1600   Gross per 24 hour   Intake                0 ml   Output             1130 ml   Net            -1130 ml       Lines/Drains/Airways     Drain                 Suprapubic Catheter 01/13/13 1828 days          Peripheral Intravenous Line                 Peripheral IV - Single Lumen 01/15/18 1525 Left Forearm less than 1 day                Physical Exam   Constitutional: He is oriented to person, place, and time. He appears well-developed and well-nourished.   HENT:   Head: Normocephalic and atraumatic.   Eyes: EOM are normal. Pupils are equal, round, and reactive to light.   Neck: Neck supple. No JVD present. No thyromegaly present.   Cardiovascular: Normal rate and regular rhythm.  PMI is displaced.  Exam reveals no gallop and no friction rub.    No murmur heard.  Pulses:       Carotid pulses are 2+ on the right side, and 2+ on the left side.       Radial pulses are 2+ on the right side, and 2+ on the left side.        Femoral pulses are 2+ on the right side, and 2+ on the left side.       Dorsalis pedis pulses are 1+ on the right side, and 1+ on the left side.        Posterior tibial pulses are 1+ on the right side, and 1+ on the left side.   Pulmonary/Chest: Effort normal. He has no wheezes. He has no rhonchi. He has no rales.   Abdominal: Soft. Normal  appearance. He exhibits no distension. There is no hepatosplenomegaly. There is no tenderness.   Neurological: He is alert and oriented to person, place, and time. Gait normal.   Skin: Skin is warm and dry. No rash noted. No erythema.   Psychiatric: He has a normal mood and affect.

## 2018-01-15 NOTE — ASSESSMENT & PLAN NOTE
No recurrent angina or signs of myocardial ischemia since multi-vessel PCI  -request cardiac rehab consult

## 2018-01-15 NOTE — PLAN OF CARE
Problem: Physical Therapy Goal  Goal: Physical Therapy Goal  Goals to be met by: 2018     Patient will increase functional independence with mobility by performin. Supine to sit with Stand-by Assistance  2. Sit to supine with MInimal Assistance  3. Sit to stand transfer with Stand-by Assistance  4. Gait  x 100 feet with Contact Guard Assistance using Rolling Walker.   5. Lower extremity exercise program x15 reps per handout, with supervision    Outcome: Ongoing (interventions implemented as appropriate)  Pt evaluation complete.     ZENON WIN, PT  1/15/2018

## 2018-01-15 NOTE — PROGRESS NOTES
JUAN CARLOS Mcgarry, NP notified patient's BP 83/51, MAP 65. Patient states he is dizzy, but the dizziness isn't any worse than usual. NP to place orders for NS bolus and will look at decreasing medications. Will continue to monitor.

## 2018-01-15 NOTE — PROGRESS NOTES
[facility]  Interventional Cardiology  Progress Note    Patient Name: Flo De La Torre  MRN: [id number]  Admission Date: 1/13/2018  Hospital Length of Stay: 2 days  Code Status: Full Code   Attending Physician: Kassidy Lma MD   Primary Care Physician: Paulo Fortune Jr, MD  Principal Problem:3-vessel CAD    Subjective:     Interval History: Patient denies angina or dyspnea.  No access site pain.    Objective:     Vital Signs (Most Recent):  Temp: 98.9 °F (37.2 °C) (01/15/18 1611)  Pulse: 79 (01/15/18 1611)  Resp: 18 (01/15/18 1611)  BP: (!) 119/58 (01/15/18 1611)  SpO2: (!) 94 % (01/15/18 1611) Vital Signs (24h Range):  Temp:  [97.9 °F (36.6 °C)-98.9 °F (37.2 °C)] 98.9 °F (37.2 °C)  Pulse:  [] 79  Resp:  [8-35] 18  SpO2:  [93 %-97 %] 94 %  BP: ()/(51-86) 119/58     Weight: 99.5 kg (219 lb 5.7 oz)  Body mass index is 28.94 kg/m².    SpO2: (!) 94 %  O2 Device (Oxygen Therapy): room air      Intake/Output Summary (Last 24 hours) at 01/15/18 1704  Last data filed at 01/15/18 1600   Gross per 24 hour   Intake                0 ml   Output             1130 ml   Net            -1130 ml       Lines/Drains/Airways     Drain                 Suprapubic Catheter 01/13/13 1828 days          Peripheral Intravenous Line                 Peripheral IV - Single Lumen 01/15/18 1525 Left Forearm less than 1 day                Physical Exam   Constitutional: He is oriented to person, place, and time. He appears well-developed and well-nourished.   HENT:   Head: Normocephalic and atraumatic.   Eyes: EOM are normal. Pupils are equal, round, and reactive to light.   Neck: Neck supple. No JVD present. No thyromegaly present.   Cardiovascular: Normal rate and regular rhythm.  PMI is displaced.  Exam reveals no gallop and no friction rub.    No murmur heard.  Pulses:       Carotid pulses are 2+ on the right side, and 2+ on the left side.       Radial pulses are 2+ on the right side, and 2+ on the left side.         Femoral pulses are 2+ on the right side, and 2+ on the left side.       Dorsalis pedis pulses are 1+ on the right side, and 1+ on the left side.        Posterior tibial pulses are 1+ on the right side, and 1+ on the left side.   Pulmonary/Chest: Effort normal. He has no wheezes. He has no rhonchi. He has no rales.   Abdominal: Soft. Normal appearance. He exhibits no distension. There is no hepatosplenomegaly. There is no tenderness.   Neurological: He is alert and oriented to person, place, and time. Gait normal.   Skin: Skin is warm and dry. No rash noted. No erythema.   Psychiatric: He has a normal mood and affect.       Assessment and Plan:     Patient is a 82 y.o. male presenting with:    * 3-vessel CAD    Patient is s/p multi-vessel PCI including LM, proximal LAD, mid LAD, proximal LCx, mid RCA, and distal RCA.  Please see full report in EPIC  -Ticagrelor 90mg po BID X 1 year  -EC ASA 81mg po qday  -agree with Coreg  -please order cardiac rehab consult          NSTEMI (non-ST elevated myocardial infarction)    No recurrent angina or signs of myocardial ischemia since multi-vessel PCI  -request cardiac rehab consult        Atrial fibrillation    Patient was noted to be in atrial fibrillation on telemetry.  He appears to be asymptomatic and had awith a ventricular rate in 80's at the time of my exam  CHADS2-VASC score is at least 5. HAS-BLED score=2, but patient and his family report a history of falls.  I had a detailed discussion regarding atrial fibrillation and stroke risk with the patient and his family. I discussed the reduction in stroke with anti-coagulation as well as the risk of bleeding with an anti-coagulant such as coumadin as well as the increased risk with coumadin plus DAPT.  I also discussed the potential possibility of ZEB occlusion in the future.  The patient and hsi family opted for no anti-coagulation for Atrial fibrillation for now and will discuss ZEB occlusion in the future         Transaminitis    AST> 2 x ALT; etiology of transaminitis is unclear at this time, but AST is decreasing;  -consider folate and thiamine supplementation or multi-vitamin           S/P drug eluting coronary stent placement    -DAPT as above  -phase 1 cardiac rehab consult        Cerebellar ataxia    -patient currently on bedrest; he reports using a cane to ambulate  -PT consult appreciated        Dementia    -patient has poor short term memory; family is at bedside providing reassurance to patient        Essential hypertension    -patient had symptomatic hypotension this AM  -rec stop Norvasc  -continue Coreg  -continue ACE-I          Peripheral arterial disease    -no evidence of CLI  -outpatient bilateral LE duplex        Dyslipidemia    -continue high intensity statin          PT consult appreciated    VTE Risk Mitigation         Ordered     enoxaparin injection 40 mg  Daily     Route:  Subcutaneous        01/14/18 1042     Low Risk of VTE  Once      01/13/18 0816          Dano Soriano MD  Interventional Cardiology  [facility]

## 2018-01-15 NOTE — PT/OT/SLP EVAL
Occupational Therapy   Evaluation    Name: Flo De La Torre  MRN: [id number]  Admitting Diagnosis:  3-vessel CAD      Recommendations:     Discharge Recommendations: nursing facility, skilled  Discharge Equipment Recommendations:   (TBD)  Barriers to discharge:  Decreased caregiver support, Inaccessible home environment (3 GILBERT bedroom)    History:     Occupational Profile:  Living Environment: Pt lives with wife in 1-story house with 3 GILB[address] bedroom. Daughter lives nearby and able to assist; wife not in good health.  Previous level of function: Using cane for ambulation, mostly (I) with ADLs  Equipment Owned:  cane, straight, rollator, power chair, grab bar, raised toilet, walker, rolling  Assistance upon Discharge: Does not have 24 hr assistance     Past Medical History:   Diagnosis Date    3-vessel CAD     Alzheimer disease     Ataxia     BPH (benign prostatic hyperplasia)     Carotid stenosis     Cerebellar ataxia     Chronic nausea     HLD (hyperlipidemia)     HTN (hypertension)     TIA (transient ischemic attack)     Type 2 diabetes mellitus        Past Surgical History:   Procedure Laterality Date    APPENDECTOMY      CARDIAC CATHETERIZATION      malignant melanoma          Subjective     Chief Complaint: Nausea  Patient/Family stated goals: Short-term SNF  Communicated with: RN prior to session.  Pain/Comfort:  · Pain Rating 1: 0/10 (c/o nausea)    Objective:     Patient found with: telemetry, richardson catheter    General Precautions: Standard, fall   Orthopedic Precautions:N/A   Braces: N/A     Occupational Performance:    Bed Mobility:    · Patient completed Scooting/Bridging with minimum assistance to EOB  · Patient completed Supine to Sit with minimum assistance and HOB elevated    Functional Mobility/Transfers:  · Patient completed Sit <> Stand Transfer with minimum assistance with rolling walker x 3 trials from EOB; cues for hand placement     Activities of Daily Living:  · UB Dressing:  moderate assistance to don robe  · LB Dressing: total assistance to don shoes EOB    Cognitive/Visual Perceptual:  Cognitive/Psychosocial Skills:     -       Oriented to: Person, Place and Time   -       Follows Commands/attention:Follows one-step commands; flat affect  -       Safety awareness/insight to disability: impaired     Physical Exam:  Balance:    -       Static/dynamic sitting SBA; dynamic standing Min A  Postural examination/scapula alignment:    -       Rounded shoulders  -       Forward head  Upper Extremity Range of Motion:     -       Right Upper Extremity: WFL  -       Left Upper Extremity: WFL  Upper Extremity Strength:    -       Right Upper Extremity: 3+/5 throughout  -       Left Upper Extremity: 3+/5 throughout   Strength:    -       Right Upper Extremity: WFL  -       Left Upper Extremity: WFL    Patient left up in chair with all lines intact, call button in reach, RN notified and daughter present    [facility] 6 Click:  [facility] Total Score: 14    Treatment & Education:  OT/PT eval; educated on OT role and POC; white board updated; pt able to take ~6 side shuffled steps with Min A using RW, cues for safety and use of RW; discussed DME and D/C recs with pt and daughter; BSC and RW ordered for use in room and pt/daughter verbalized understanding to call for assistance before getting up  Education:    Assessment:     Flo De La Torre is a 82 y.o. male with a medical diagnosis of 3-vessel CAD.  He presents with performance deficits including weakness, impaired endurance, impaired self care skills, impaired functional mobilty, gait instability, impaired balance, decreased coordination, decreased safety awareness, impaired cardiopulmonary response to activity. Pt with flat affect, also limited by nausea. Pt would continue to benefit from OT to increase independence. Recommend SNF upon D/C.    Rehab Prognosis:  Good; patient would benefit from acute skilled OT services to address these deficits and  "reach maximum level of function.         Clinical Decision Makin.  OT Mod:  "Pt evaluation falls under moderate complexity for evaluation coding due to identification of 3-5 performance deficits noted as stated above. Eval required Min/Mod assistance to complete on this date and detailed assessment(s) were utilized. Moreover, an expanded review of history and occupational profile obtained with additional review of cognitive, physical and psychosocial hx."     Plan:     Patient to be seen 5 x/week to address the above listed problems via self-care/home management, therapeutic exercises, therapeutic activities  · Plan of Care Expires: 02/15/18  · Plan of Care Reviewed with: patient, daughter    This Plan of care has been discussed with the patient who was involved in its development and understands and is in agreement with the identified goals and treatment plan    GOALS:    Occupational Therapy Goals        Problem: Occupational Therapy Goal    Goal Priority Disciplines Outcome Interventions   Occupational Therapy Goal     OT, PT/OT Ongoing (interventions implemented as appropriate)    Description:  Goals to be met by: 7 days (18)     Patient will increase functional independence with ADLs by performing:    UE Dressing with Stand-by Assistance.  LE Dressing with Minimal Assistance.  Grooming while EOB with Stand-by Assistance.  Toileting from bedside commode with Minimal Assistance for hygiene and clothing management.   Supine to sit with Contact Guard Assistance.  Toilet transfer to toilet with Contact Guard Assistance.  Pt will perform functional mobility household distance with CGA using AD as needed.                      Time Tracking:     OT Date of Treatment: 01/15/18  OT Start Time: 928  OT Stop Time: 949  OT Total Time (min): 21 min    Billable Minutes:Evaluation 21 minutes    KJ Patel  1/15/2018    "

## 2018-01-15 NOTE — PLAN OF CARE
Problem: Cardiac Catheterization (Diagnostic/Interventional) (Adult)  Intervention: Monitor ECG and Peripheral Pulses  Variance: Arrhythmia  New onset atrial fib with rate controlled, Cards service notified, monitor for rate changes and convert on own, consider DC cardioversion if nec.  Decreased UOP, poor oral intake due to nausea, loss of appetite, Lasix given with fair diuresis, family encouraging intake, antiemetics as needed, continue to monitor groin/distal pulses, and recurring chest pain.

## 2018-01-16 VITALS
WEIGHT: 213.63 LBS | DIASTOLIC BLOOD PRESSURE: 66 MMHG | TEMPERATURE: 98 F | HEART RATE: 85 BPM | SYSTOLIC BLOOD PRESSURE: 101 MMHG | OXYGEN SATURATION: 95 % | HEIGHT: 73 IN | BODY MASS INDEX: 28.31 KG/M2 | RESPIRATION RATE: 18 BRPM

## 2018-01-16 DIAGNOSIS — I21.4 NSTEMI (NON-ST ELEVATED MYOCARDIAL INFARCTION): Primary | ICD-10-CM

## 2018-01-16 DIAGNOSIS — Z98.61 S/P PTCA (PERCUTANEOUS TRANSLUMINAL CORONARY ANGIOPLASTY): ICD-10-CM

## 2018-01-16 PROBLEM — D64.9 ANEMIA: Status: ACTIVE | Noted: 2018-01-16

## 2018-01-16 LAB
ALBUMIN SERPL BCP-MCNC: 2.9 G/DL
ALBUMIN SERPL BCP-MCNC: 3 G/DL
ALP SERPL-CCNC: 48 U/L
ALT SERPL W/O P-5'-P-CCNC: 33 U/L
ANION GAP SERPL CALC-SCNC: 7 MMOL/L
ANION GAP SERPL CALC-SCNC: 8 MMOL/L
APTT BLDCRRT: 23.1 SEC
AST SERPL-CCNC: 59 U/L
BASOPHILS # BLD AUTO: 0 K/UL
BASOPHILS NFR BLD: 0 %
BILIRUB SERPL-MCNC: 0.6 MG/DL
BUN SERPL-MCNC: 30 MG/DL
BUN SERPL-MCNC: 33 MG/DL
CALCIUM SERPL-MCNC: 8.4 MG/DL
CALCIUM SERPL-MCNC: 8.7 MG/DL
CHLORIDE SERPL-SCNC: 104 MMOL/L
CHLORIDE SERPL-SCNC: 105 MMOL/L
CO2 SERPL-SCNC: 27 MMOL/L
CO2 SERPL-SCNC: 28 MMOL/L
CREAT SERPL-MCNC: 0.8 MG/DL
CREAT SERPL-MCNC: 0.9 MG/DL
DIFFERENTIAL METHOD: ABNORMAL
EOSINOPHIL # BLD AUTO: 0.1 K/UL
EOSINOPHIL NFR BLD: 0.8 %
ERYTHROCYTE [DISTWIDTH] IN BLOOD BY AUTOMATED COUNT: 13.1 %
EST. GFR  (AFRICAN AMERICAN): >60 ML/MIN/1.73 M^2
EST. GFR  (AFRICAN AMERICAN): >60 ML/MIN/1.73 M^2
EST. GFR  (NON AFRICAN AMERICAN): >60 ML/MIN/1.73 M^2
EST. GFR  (NON AFRICAN AMERICAN): >60 ML/MIN/1.73 M^2
GLUCOSE SERPL-MCNC: 147 MG/DL
GLUCOSE SERPL-MCNC: 178 MG/DL
HCT VFR BLD AUTO: 30.9 %
HCT VFR BLD AUTO: 31.9 %
HGB BLD-MCNC: 10.3 G/DL
HGB BLD-MCNC: 10.7 G/DL
IMM GRANULOCYTES # BLD AUTO: 0.04 K/UL
IMM GRANULOCYTES NFR BLD AUTO: 0.3 %
INR PPP: 1
LYMPHOCYTES # BLD AUTO: 1 K/UL
LYMPHOCYTES NFR BLD: 8.8 %
MAGNESIUM SERPL-MCNC: 2.3 MG/DL
MCH RBC QN AUTO: 29.3 PG
MCHC RBC AUTO-ENTMCNC: 33.3 G/DL
MCV RBC AUTO: 88 FL
MONOCYTES # BLD AUTO: 0.9 K/UL
MONOCYTES NFR BLD: 7.7 %
NEUTROPHILS # BLD AUTO: 9.8 K/UL
NEUTROPHILS NFR BLD: 82.4 %
NRBC BLD-RTO: 0 /100 WBC
PHOSPHATE SERPL-MCNC: 2.4 MG/DL
PHOSPHATE SERPL-MCNC: 2.8 MG/DL
PLATELET # BLD AUTO: 169 K/UL
PMV BLD AUTO: 10.4 FL
POCT GLUCOSE: 170 MG/DL (ref 70–110)
POCT GLUCOSE: 187 MG/DL (ref 70–110)
POTASSIUM SERPL-SCNC: 3.8 MMOL/L
POTASSIUM SERPL-SCNC: 4.1 MMOL/L
PROT SERPL-MCNC: 5.8 G/DL
PROTHROMBIN TIME: 10.3 SEC
RBC # BLD AUTO: 3.52 M/UL
SODIUM SERPL-SCNC: 139 MMOL/L
SODIUM SERPL-SCNC: 140 MMOL/L
WBC # BLD AUTO: 11.88 K/UL

## 2018-01-16 PROCEDURE — 36415 COLL VENOUS BLD VENIPUNCTURE: CPT

## 2018-01-16 PROCEDURE — 85025 COMPLETE CBC W/AUTO DIFF WBC: CPT

## 2018-01-16 PROCEDURE — 25000003 PHARM REV CODE 250: Performed by: STUDENT IN AN ORGANIZED HEALTH CARE EDUCATION/TRAINING PROGRAM

## 2018-01-16 PROCEDURE — 63600175 PHARM REV CODE 636 W HCPCS: Performed by: INTERNAL MEDICINE

## 2018-01-16 PROCEDURE — 63600175 PHARM REV CODE 636 W HCPCS: Performed by: STUDENT IN AN ORGANIZED HEALTH CARE EDUCATION/TRAINING PROGRAM

## 2018-01-16 PROCEDURE — 80069 RENAL FUNCTION PANEL: CPT

## 2018-01-16 PROCEDURE — 99239 HOSP IP/OBS DSCHRG MGMT >30: CPT | Mod: ,,, | Performed by: NURSE PRACTITIONER

## 2018-01-16 PROCEDURE — 97535 SELF CARE MNGMENT TRAINING: CPT

## 2018-01-16 PROCEDURE — 85018 HEMOGLOBIN: CPT

## 2018-01-16 PROCEDURE — 97530 THERAPEUTIC ACTIVITIES: CPT

## 2018-01-16 PROCEDURE — 85014 HEMATOCRIT: CPT

## 2018-01-16 PROCEDURE — 85610 PROTHROMBIN TIME: CPT

## 2018-01-16 PROCEDURE — 25000003 PHARM REV CODE 250: Performed by: INTERNAL MEDICINE

## 2018-01-16 PROCEDURE — 99231 SBSQ HOSP IP/OBS SF/LOW 25: CPT | Mod: GC,,, | Performed by: INTERNAL MEDICINE

## 2018-01-16 PROCEDURE — 25000003 PHARM REV CODE 250: Performed by: NURSE PRACTITIONER

## 2018-01-16 PROCEDURE — 80053 COMPREHEN METABOLIC PANEL: CPT

## 2018-01-16 PROCEDURE — 83735 ASSAY OF MAGNESIUM: CPT

## 2018-01-16 PROCEDURE — 85730 THROMBOPLASTIN TIME PARTIAL: CPT

## 2018-01-16 PROCEDURE — 84100 ASSAY OF PHOSPHORUS: CPT

## 2018-01-16 RX ORDER — DRONABINOL 2.5 MG/1
2.5 CAPSULE ORAL
Qty: 30 CAPSULE | Refills: 5 | Status: SHIPPED | OUTPATIENT
Start: 2018-01-16

## 2018-01-16 RX ORDER — SODIUM,POTASSIUM PHOSPHATES 280-250MG
2 POWDER IN PACKET (EA) ORAL ONCE
Status: COMPLETED | OUTPATIENT
Start: 2018-01-16 | End: 2018-01-16

## 2018-01-16 RX ORDER — CARVEDILOL 12.5 MG/1
12.5 TABLET ORAL 2 TIMES DAILY
Qty: 60 TABLET | Refills: 11 | Status: SHIPPED | OUTPATIENT
Start: 2018-01-16 | End: 2019-01-16

## 2018-01-16 RX ADMIN — INSULIN ASPART 2 UNITS: 100 INJECTION, SOLUTION INTRAVENOUS; SUBCUTANEOUS at 08:01

## 2018-01-16 RX ADMIN — ONDANSETRON 4 MG: 2 INJECTION INTRAMUSCULAR; INTRAVENOUS at 10:01

## 2018-01-16 RX ADMIN — INSULIN ASPART 2 UNITS: 100 INJECTION, SOLUTION INTRAVENOUS; SUBCUTANEOUS at 12:01

## 2018-01-16 RX ADMIN — DRONABINOL 2.5 MG: 2.5 CAPSULE ORAL at 08:01

## 2018-01-16 RX ADMIN — POTASSIUM & SODIUM PHOSPHATES POWDER PACK 280-160-250 MG 2 PACKET: 280-160-250 PACK at 10:01

## 2018-01-16 RX ADMIN — ONDANSETRON 4 MG: 4 TABLET, ORALLY DISINTEGRATING ORAL at 07:01

## 2018-01-16 RX ADMIN — ASPIRIN 81 MG: 81 TABLET, COATED ORAL at 08:01

## 2018-01-16 RX ADMIN — THERA TABS 1 TABLET: TAB at 10:01

## 2018-01-16 RX ADMIN — TICAGRELOR 90 MG: 90 TABLET ORAL at 08:01

## 2018-01-16 RX ADMIN — CARVEDILOL 12.5 MG: 6.25 TABLET, FILM COATED ORAL at 08:01

## 2018-01-16 RX ADMIN — LISINOPRIL 20 MG: 20 TABLET ORAL at 08:01

## 2018-01-16 NOTE — ASSESSMENT & PLAN NOTE
Patient today with unstable angina that was refractory to nitroglycerin so was transferred to the ICU and started on a nitro gtt at 40 which helped with the pain but did not relieve it all the way. CTS evaluated the patient and his was deemed a poor candidate for CABG. The decision was made to take the patient to the cath lab. He was given solumedrol from prior dye allergy and loaded with brilinta. Multi-vessel PCI was performed with impella CP support with AMY to LM, proximal LAD, mid LAD, proximal LCx, mid RCA, and distal RCA.  -patients family making the decision to hold anticoagulation at this time 2/2 risk of bleed. This decision was discussed with Dr. Soriano.  CHADsVASC score of 7 imparts 11.2% annual risk of thromboembolic event    - Ticagrelor 90mg po BID X 1 year would be another option if patients family decides for anticoagulation   - EC ASA 81mg po qday  - Coreg 12.5mg po BID

## 2018-01-16 NOTE — PT/OT/SLP PROGRESS
"Physical Therapy Treatment/Discharge Summary    Patient Name:  Flo De La Torre   MRN:  [id number]    Recommendations:     Discharge Recommendations:  nursing facility, skilled   Discharge Equipment Recommendations:  (TBD next level of care)   Barriers to discharge: Inaccessible home and Decreased caregiver support    Assessment:     Flo De La Torre is a 82 y.o. male admitted with a medical diagnosis of NSTEMI (non-ST elevated myocardial infarction).  He presents with the following impairments/functional limitations:  weakness, impaired endurance, impaired sensation, gait instability, impaired functional mobilty, impaired self care skills, impaired balance, impaired cognition, decreased lower extremity function, decreased upper extremity function, impaired fine motor, impaired coordination, impaired cardiopulmonary response to activity, impaired joint extensibility. Pt demo improved gait and standing balance tolerance this date with decreased reports of dizziness/nausea. Pt tolerated OOB activity x4 hours prior to PT session. Able to ambulate x24 feet with min A from PT and SPC. Appropriate for D/C to SNF.    Rehab Prognosis:  Good; patient would benefit from acute skilled PT services to address these deficits and reach maximum level of function.      Recent Surgery: Procedure(s) (LRB):  Coronary angiography (N/A)      Plan:     During this hospitalization, patient to be seen 5 x/week to address the above listed problems via gait training, therapeutic activities, therapeutic exercises, neuromuscular re-education  · Plan of Care Expires:  02/15/18   Plan of Care Reviewed with: patient, spouse, family    Subjective     Communicated with RN (Barb) prior to session.  Patient found resting in supine; family at bedside upon PT entry to room, agreeable to treatment.    Chief Complaint: None noted  Patient comments/goals: "  Pain/Comfort:  · Pain Rating 1: 0/10  · Pain Rating Post-Intervention 1: 0/10    Patients " cultural, spiritual, Mormon conflicts given the current situation: None noted    Objective:     Patient found with: telemetry, richardson catheter     General Precautions: Standard, fall     Functional Mobility:  Bed Mobility:   Rolling to the L: Minimal Assistance   Supine to Sit: From L sidelying. Minimal Assistance   Scooting at EOB: Stand-by Assistance for feet flat on floor    Sitting Balance at Edge of Bed:   Assistance Level Required: Supervision   Time: x2 min for donning of slippers   Postural deviations noted: -       Rounded shoulders  -       Forward head  -       Posterior pelvic tilt   Encouraged: upright posture, neutral pelvis    Transfers:   Sit to Stand: CGA with No Assistive Device from elevated EOB   Stand to Sit: Contact Guard Assistance with No Assistive Device for controlled descent to surface    Gait:   Distance Ambulated: Pt ambulated x24 feet in hospital room. Pt demo forward flexed posture, shuffling gait pattern and req VC/TCs for upright posture and appropriate sequencing to maintain safety. Pt with decreased and req min A for stability/progression of gait. No overt LOB; generalized instability noted with use of SPC and min A from PT.    Assistance level: Minimal Assistance   Assistive Device used:  Straight Cane   Gait Pattern: swing to   Gait Deviation(s): decreased luis, increased time in double stance, decreased step length, increased stride width and decreased weight-shifting ability   Impairments due to: B LE weakness, impaired coordination, decreased activity tolerance.    Therapeutic Activities and Exercises:   PT arrived to pt's room to find pt resting quietly; agreeable to PT session. Pt performed mobility as above. Upon return to bedside, pt agreeable to remain seated UIC to eat lunch. PT reviewed mobility needs and transfers. Deferred further mobility 2/2 lunch delivery and pending D/C to SNF this PM. Questions/concerns addressed within PT scope of practice; pt and pt's  family with no further questions.    AM-PAC 6 CLICK MOBILITY  Turning over in bed (including adjusting bedclothes, sheets and blankets)?: 3  Sitting down on and standing up from a chair with arms (e.g., wheelchair, bedside commode, etc.): 3  Moving from lying on back to sitting on the side of the bed?: 3  Moving to and from a bed to a chair (including a wheelchair)?: 3  Need to walk in hospital room?: 3  Climbing 3-5 steps with a railing?: 3  Total Score: 18     Patient left up in chair with all lines intact, call button in reach and RN notified..    GOALS:    Physical Therapy Goals        Problem: Physical Therapy Goal    Goal Priority Disciplines Outcome Goal Variances Interventions   Physical Therapy Goal     PT/OT, PT Outcomes achieved     Description:  Goals to be met by: 2018     Patient will increase functional independence with mobility by performin. Supine to sit with Stand-by Assistance  2. Sit to supine with MInimal Assistance  3. Sit to stand transfer with Stand-by Assistance  4. Gait  x 100 feet with Contact Guard Assistance using Rolling Walker.   5. Lower extremity exercise program x15 reps per handout, with supervision                 Time Tracking:     PT Received On: 18  PT Start Time: 1213     PT Stop Time: 1225  PT Total Time (min): 12 min     Billable Minutes: Therapeutic Activity 10    Treatment Type: Treatment  PT/PTA: PT       Coretta Walden, PT, DPT  [phone]  2018

## 2018-01-16 NOTE — ASSESSMENT & PLAN NOTE
Blood pressure meds restarted. Lisinopril 20, Coreg 12.5  -Discontinued Norvasc 5  -Continue home coreg today

## 2018-01-16 NOTE — HOSPITAL COURSE
Mr. De La Torre was admitted 1/13/18 from [facility] then immediately transferred to CCU due to refractory chest pain and initiated on NTG gtt. During CCU stay he was evaluated by CTS and deemed a poor candidate for CABG. It was then decided he was a high risk PCI candidate and he's now s/p LHC on 1/3/18.  He had AMY placed to LM, proximal LAD, mid LAD, proximal LCx, mid RCA, and distal RCA. Patient transferred to step down on 1/14/18 and hospital medicine took over service on 1/15/18.  During transition on 1/14/18 he went into afib with controlled rate.  It was discussed with Cardiology co-managed and his family that with his current unstable shuffling gait and fall risk, that they would forgo addition of OAC and continue rate control  As for his unsteady gait, PT recommended SNF, he was accepted to his first choice facility and SW facilitated.     Disposition: SNF with family transport, outpt follow up with Cardiology

## 2018-01-16 NOTE — ASSESSMENT & PLAN NOTE
- now hypotensive, home coreg appears to be 3.125, now he's at 25  - scale back to 12.5 tonight and d/c home norvasc in the am as he already got a dose today

## 2018-01-16 NOTE — PROGRESS NOTES
[facility] Hospital Medicine  Progress Note    Patient Name: Flo De La Torre  MRN: [id number]  Patient Class: IP- Inpatient   Admission Date: 1/13/2018  Length of Stay: 2 days  Attending Physician: Kassidy Lam MD  Primary Care Provider: Paulo Fortune Jr, MD    Hospital Medicine Team: [facility] LAURENCE Mcgarry NP    Subjective:     Principal Problem:NSTEMI (non-ST elevated myocardial infarction)    HPI:  81 y/o male with PMH of mild dementia, hypertension, hyperlipidemia, severe 3V CAD with prior PTCA in [facility] 1/11/18 distal LMCA 90%, oLAD 90%, oLcx 90%, mRCA 95%, EF 55%,  HTN, HLD, T2DM, BPH, carotid artery stenosis, and TIA who presented to [facility] with unstable angina despite beta blocker, nitrate, and 4 baby aspirin. States CP lasted 10-15 min, radiated down arm. SOB week prior to episode. Woke up patient from sleep.  He was initially on nitro gtt there but on transfer that was stopped and he was on the floor here. Upon presentation to [facility], he had chest pain that was refractory to sublingual nitro so cardiology was consulted. On evaluation of the patient EKG with diffuse ST depressions in inferior, anterior, and lateral leads. He was transferred to the ICU and started on nitroglycerin gtt. Which helped a bit with the chest pain but did not relieve it all the way.      Dx with stable angina 2 weeks prior, started on beta- blockade. Prior LHC in 2002 showed severe diffuse CAD with  of RCA collateralized and preserved LV fxn. Patient interactive, issues with short term memory. Able to care perform ADL's. Former opthalmologist.      Echo 1/12/17: disc did not work but states EF is 75%     Functionally: shuffling walk, able to walk 1/2 mile per daughter with a cane  Bladder atony: chronic richardson for years per family       Hospital Course:  Admitted to [facility] on 1/13/18 immediately transferred to CCU for nitro gtt for refractory chest pain/ Ntg Gtt where  he was evaluated by CTS and deemed a poor candidate for CABG. It was then decided he was a high risk PCI candidate and he's s/p LHC on 1/3/18.  He had AMY placed to LM, proximal LAD, mid LAD, proximal LCx, mid RCA, and distal RCA.   Patient  transferred to step down on 1/14/18 and hospital medicine took over service on 1/15/18.  During transition on 1/14/18 he went into afib with controlled rate.  It was discussed with cards co managed and the family that with his current unstable shuffling gait and possible triple therapy, that they would forgo addition of AC for now, and continue rate control.  His rate is the 80-90's.  As for his unsteady gait, PT has recommended SNF consult.  The family has a facility in mind which Ms. Melissa ALBERTO is working on, however today is a national holiday and the insurance company is closed.  Paperwork has been faxed and should be evaluated in am.    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  When mobile, he plugs his catheter and when the sensation arises he empties in the bathroom, however while in the hospital he lets it drain into the bag.     Dispo: SNF in [place]    Interval History: patient without c/o, feeling better overall, chronically nauseated and dizzy d/t cerebellar ataxia and now hasn't had a bm for almost a week. Family to arrange amitiza to be sent to pharmacy downstairs so they can give it to him.        Review of Systems   Constitutional: Positive for activity change, appetite change and fatigue. Negative for chills and fever.   HENT: Negative for congestion, sore throat and trouble swallowing.    Eyes: Positive for discharge. Negative for redness and visual disturbance.   Respiratory: Positive for shortness of breath. Negative for cough and wheezing.    Cardiovascular: Negative for chest pain, palpitations and leg swelling.   Gastrointestinal: Negative for abdominal pain, constipation (no BM for a week, takes amitiza ), diarrhea, nausea (chronic d/t  cerebellar ataxia and chronic dizziness) and vomiting.   Endocrine: Negative for cold intolerance and heat intolerance.   Genitourinary: Negative for difficulty urinating (incontinent, lack of bladder tone and no sphincter tone) and hematuria.        Chronic indwelling richardson for bladder atony and no sphincter tone   Musculoskeletal: Positive for gait problem (cerebellar ataxia). Negative for back pain and myalgias.   Skin: Negative for color change, rash and wound.   Allergic/Immunologic: Negative for immunocompromised state.   Neurological: Positive for dizziness (chronic from cerebellar ataxia) and weakness. Negative for light-headedness, numbness and headaches.   Hematological: Does not bruise/bleed easily.   Psychiatric/Behavioral: Positive for confusion. Negative for agitation, decreased concentration and sleep disturbance. The patient is not nervous/anxious.      Objective:     Vital Signs (Most Recent):  Temp: 96.8 °F (36 °C) (01/15/18 2026)  Pulse: 90 (01/15/18 2026)  Resp: 18 (01/15/18 2026)  BP: 120/65 (01/15/18 2026)  SpO2: (!) 93 % (01/15/18 2026) Vital Signs (24h Range):  Temp:  [96.8 °F (36 °C)-98.9 °F (37.2 °C)] 96.8 °F (36 °C)  Pulse:  [] 90  Resp:  [8-35] 18  SpO2:  [93 %-97 %] 93 %  BP: ()/(51-86) 120/65     Weight: 99.5 kg (219 lb 5.7 oz)  Body mass index is 28.94 kg/m².    Intake/Output Summary (Last 24 hours) at 01/15/18 2049  Last data filed at 01/15/18 1600   Gross per 24 hour   Intake                0 ml   Output              895 ml   Net             -895 ml      Physical Exam   Constitutional: He is oriented to person, place, and time. He appears well-developed and well-nourished. No distress.   HENT:   Head: Normocephalic and atraumatic.   Right Ear: External ear normal.   Left Ear: External ear normal.   Nose: Nose normal.   Eyes: Conjunctivae and EOM are normal.   Neck: Normal range of motion. Neck supple. No JVD present.   Cardiovascular: Normal rate, regular rhythm, normal  heart sounds and intact distal pulses.    No murmur heard.  Pulmonary/Chest: Effort normal. No respiratory distress. He has decreased breath sounds in the right lower field and the left lower field. He has no wheezes. He has no rales.   Abdominal: Soft. Bowel sounds are normal. He exhibits no distension and no mass. There is no tenderness. There is no guarding.   Genitourinary: Penis normal.   Genitourinary Comments: Chronic richardson     Musculoskeletal: Normal range of motion. He exhibits edema (+ ble ).   Neurological: He is alert and oriented to person, place, and time. No cranial nerve deficit or sensory deficit. Coordination (uses can and can only walk a few steps at a time) abnormal.   Skin: Skin is warm and dry. Capillary refill takes 2 to 3 seconds. No rash noted. He is not diaphoretic. No erythema.   Psychiatric: He has a normal mood and affect. His behavior is normal.   Nursing note and vitals reviewed.      Significant Labs:   BMP:   Recent Labs  Lab 01/15/18  0426   *      K 4.0      CO2 26   BUN 26*   CREATININE 0.9   CALCIUM 8.6*   MG 2.2     CBC:   Recent Labs  Lab 01/14/18  0354 01/15/18  0426   WBC 19.58* 13.62*   HGB 12.3* 11.0*   HCT 36.2* 32.8*    165     Coagulation:   Recent Labs  Lab 01/15/18  0426   INR 1.0   APTT 23.7       Significant Imaging: I have reviewed all pertinent imaging results/findings within the past 24 hours.    Assessment/Plan:      * NSTEMI (non-ST elevated myocardial infarction)    - s/p LHC with impella support as noted above  - coreg home dose was titrated aggressively and b/p dropped into 80's, no more dizzy than usual, s/p bolus NS 250ml and b/p  to 120's  - d/c'ed home norvasc and turned down coreg to 12.5mg tonight  - continue ASA, plavix, statin, brilinta, acei           Atrial fibrillation    - Chadsvasc 7/ 11.2% risk of embolic event, decision was discussed with Dr. Con Soriano, and in the setting of his chronic gait disturbance, no AC  as they don't want him on triple therapy when risk of fall is high  - may consider at future date         Transaminitis    - resolving post pci        Type 2 diabetes mellitus    Low dose SSI   - hgba1c only 5.8/ 120, not sure I would consider this diabetic d/t acute stress response as of late.  - recheck hgba1c in a few months          S/P drug eluting coronary stent placement    - plavix ? pru needed   -see above            Cerebellar ataxia    - PT/OT recommending SNF, family has place in mind, Melissa faxed paperwork.   - SNF orders need to be written, [facility] Holiday and b/p dropped so not sure of full d/c plans as of yet  - on dronabinol and zofran s/e of above for dizziness        Dementia    - not on meds          Essential hypertension    - now hypotensive, home coreg appears to be 3.125, now he's at 25  - scale back to 12.5 tonight and d/c home norvasc in the am as he already got a dose today          Peripheral arterial disease    - Not very active but does not complain of any pain at present and no non healing ulcers at this time.   - Can further evaluate as an outpatient            Dyslipidemia    - crestor 20          3-vessel CAD    - see above            VTE Risk Mitigation         Ordered     enoxaparin injection 40 mg  Daily     Route:  Subcutaneous        01/14/18 1042     Low Risk of VTE  Once      01/13/18 0816              Grace Mcgarry NP  Department of Hospital Medicine   [facility]

## 2018-01-16 NOTE — PLAN OF CARE
[facility]     Department of Hospital Medicine     [address] [place] [address]     [phone] after hours  [phone] fax       NURSING HOME ORDERS    01/16/2018    Admit to Nursing Home: Skilled Bed      Diagnoses:  Active Hospital Problems    Diagnosis  POA    *NSTEMI (non-ST elevated myocardial infarction) [I21.4]  Yes     Elevated troponins, ekg with ischemic abnormalities - plan on going to cath lab, loaded with asp and brilinta.      Transaminitis [R74.0]  Yes    Atrial fibrillation [I48.91]  No    Dyslipidemia [E78.5]  Yes    Peripheral arterial disease [I73.9]  Yes    Essential hypertension [I10]  Yes    Dementia [F03.90]  Yes    Cerebellar ataxia [G11.9]  Yes    S/P drug eluting coronary stent placement [Z95.5]  Not Applicable    Type 2 diabetes mellitus [E11.9]  Yes    3-vessel CAD [I25.10]  Yes      Resolved Hospital Problems    Diagnosis Date Resolved POA    Unstable angina [I20.0] 01/13/2018 Yes       Patient is homebound due to:  NSTEMI (non-ST elevated myocardial infarction)    Allergies:  Review of patient's allergies indicates:   Allergen Reactions    Iodine and iodide containing products Anaphylaxis       Vitals:      Every shift (Skilled Nursing patients)    Diet: Cardiac with diabetic 1800 calorie diet    Supplement:  1 can every three times a day with meals                         Type:  House       Acitivities:    - Up in a chair each morning as tolerated   - Ambulate with assistance to bathroom   - Scheduled walks once each shift (every 8 hours)   - May use walker, cane, or self-propelled wheelchair    LABS:  Per facility protocol   BMP, CBC every other day   Stool for occult blood if CBC down trends     Nursing Precautions:      - Fall precautions per nursing home protocol    CONSULTS:   Physical Therapy to evaluate and treat     Occupational Therapy to evaluate and treat     Nutrition to evaluate and recommend  diet    MISCELLANEOUS CARE:      [place] Care: Allow patient to drain in toilet per his home routine/or assist with bag emptying Qshift per patient routine, replace follow per patients home routine as this is a chronic catheter he manages at home.      Medications: Discontinue all previous medication orders, if any. See new list below.   Flo De La Torre   Home Medication Instructions WEN:[id number]    Printed on:01/16/18 0820   Medication Information                      aspirin (ECOTRIN) 81 MG EC tablet  Take 81 mg by mouth once daily.             carvedilol (COREG) 12.5 MG tablet  Take 1 tablet (12.5 mg total) by mouth 2 (two) times daily.             cyanocobalamin (VITAMIN B-12) 100 MCG tablet  Take 1,000 mcg by mouth once daily.             dronabinol (MARINOL) 2.5 MG capsule  Take 2.5 mg by mouth 2 (two) times daily before meals.             glimepiride (AMARYL) 2 MG tablet  Take 2 mg by mouth before breakfast.             lisinopril (PRINIVIL,ZESTRIL) 20 MG tablet  Take 20 mg by mouth once daily.             lubiprostone (AMITIZA) 24 MCG Cap  Take 24 mcg by mouth 2 (two) times daily with meals.             MULTIVIT-IRON-MIN-FOLIC ACID 3,500-18-0.4 UNIT-MG-MG ORAL CHEW  Take by mouth.             multivitamin (THERAGRAN) tablet  Take 1 tablet by mouth once daily.             nitroGLYCERIN (NITROSTAT) 0.4 MG SL tablet  Place 0.4 mg under the tongue every 5 (five) minutes as needed for Chest pain.             pantoprazole (PROTONIX) 40 MG tablet  Take 40 mg by mouth once daily.             phosphorated carbohydrate (EMETROL) Soln  Take by mouth,15 mLs by mouth every 15 (fifteen) minutes as needed. until distress subsides; do not take for more than 1 hour (5 doses)              pyridoxine, vitamin B6, (VITAMIN B-6) 100 MG Tab  Take 100 mg by mouth once daily.             rosuvastatin (CRESTOR) 10 MG tablet  Take 10 mg by mouth once daily.             ticagrelor (BRILINTA) 90 mg tablet  Take 1 tablet (90 mg  total) by mouth 2 (two) times daily.                 Flora Courtney NP  Department of Hospital Medicine  [facility]

## 2018-01-16 NOTE — PROGRESS NOTES
Called report to Vidya at [facility]. Will continue to monitor.

## 2018-01-16 NOTE — PLAN OF CARE
Problem: Patient Care Overview  Goal: Plan of Care Review  Pt verbalizes no complaints. Denies CP, SOB, or other discomforts. Pt being dc'd to SNF. Pt remains free of fall or injury. Pt verbalizes understanding of plan of care. Will continue to monitor.

## 2018-01-16 NOTE — PLAN OF CARE
Problem: Physical Therapy Goal  Goal: Physical Therapy Goal  Goals to be met by: 2018     Patient will increase functional independence with mobility by performin. Supine to sit with Stand-by Assistance  2. Sit to supine with MInimal Assistance  3. Sit to stand transfer with Stand-by Assistance  4. Gait  x 100 feet with Contact Guard Assistance using Rolling Walker.   5. Lower extremity exercise program x15 reps per handout, with supervision     Outcome: Outcomes achieved.    Appropriate for D/C to SNF. D/C Acute PT services 2018.    Coretta Walden, PT, DPT  [phone]  2018

## 2018-01-16 NOTE — ASSESSMENT & PLAN NOTE
lovenox while inpatient  -Family has decided against anticoagulation with coumadin vs. NOAC s/p DC given high risk of falls  -Risks benefits have been discussed at length as stated above

## 2018-01-16 NOTE — PT/OT/SLP PROGRESS
Occupational Therapy   Treatment    Name: Flo De La Torre  MRN: [id number]  Admitting Diagnosis:  NSTEMI (non-ST elevated myocardial infarction)       Recommendations:     Discharge Recommendations: nursing facility, skilled  Discharge Equipment Recommendations:   (TBD)  Barriers to discharge:  Inaccessible home environment, Decreased caregiver support    Subjective     Communicated with: RN prior to session. Pt agreeable to OT.  Pain/Comfort:  · Pain Rating 1: 0/10  · Pain Rating Post-Intervention 1: 0/10    Objective:     Patient found with: telemetry, richardson catheter    General Precautions: Standard, fall   Orthopedic Precautions:N/A   Braces: N/A     Occupational Performance:    Bed Mobility:    · Patient completed Scooting/Bridging with minimum assistance to EOB  · Patient completed Supine to Sit with minimum assistance and cues for technique    Functional Mobility/Transfers:  · Patient completed Sit <> Stand Transfer with minimum assistance with rolling walker from EOB and toilet     Activities of Daily Living:  · Grooming: contact guard assistance standing at sink to perform oral hygiene, wash face and hands, comb hair  · UB Dressing: moderate assistance to don gown around back  · LB Dressing: total assistance to don shoes EOB  · Toileting: maximal assistance while standing to perform hygiene    Patient left seated on toilet with all lines intact and family and PCT present    [facility] 6 Click:  [facility] Total Score: 15    Treatment & Education:  Pt in bed, finishing breakfast; Min A for bed mobility; able to sit EOB with SBA to perform UB/LB dressing with assist; Min A for T/F and functional mobility to bathroom, requiring max cues to stay close to walker, also with forward flexed posture; able to perform standing ADLs at sink; toileting with Max A for hygiene; washed hands at sink and then returned to toilet for PCT to assist with bath; RW again ordered for use in room and discussed appropriate equipment for pt  with daughter  Education:    Assessment:     Flo De La Torre is a 82 y.o. male with a medical diagnosis of NSTEMI (non-ST elevated myocardial infarction).  He presents with performance deficits inculding weakness, impaired endurance, impaired self care skills, impaired functional mobilty, gait instability, impaired balance, decreased safety awareness, impaired cardiopulmonary response to activity.  Pt more awake/alert today and able to perform functional mobility to bathroom for standing ADLs. Pt making progress and would continue to benefit from OT to increase independence and safety. Recommend SNF upon D/C.    Rehab Prognosis:  Good; patient would benefit from acute skilled OT services to address these deficits and reach maximum level of function.       Plan:     Patient to be seen 5 x/week to address the above listed problems via self-care/home management, therapeutic activities, therapeutic exercises  · Plan of Care Expires: 02/15/18  · Plan of Care Reviewed with: patient, family    This Plan of care has been discussed with the patient who was involved in its development and understands and is in agreement with the identified goals and treatment plan    GOALS:    Occupational Therapy Goals        Problem: Occupational Therapy Goal    Goal Priority Disciplines Outcome Interventions   Occupational Therapy Goal     OT, PT/OT Ongoing (interventions implemented as appropriate)    Description:  Goals to be met by: 7 days (1/22/18)     Patient will increase functional independence with ADLs by performing:    UE Dressing with Stand-by Assistance.  LE Dressing with Minimal Assistance.  Grooming while EOB with Stand-by Assistance.  Modified: Grooming while standing with SBA.  Toileting from bedside commode with Minimal Assistance for hygiene and clothing management.   Supine to sit with Contact Guard Assistance.  Toilet transfer to toilet with Contact Guard Assistance.  Pt will perform functional mobility household  distance with CGA using AD as needed.                       Time Tracking:     OT Date of Treatment: 01/16/18  OT Start Time: 0846  OT Stop Time: 0924  OT Total Time (min): 38 min    Billable Minutes:Self Care/Home Management 25  Therapeutic Activity 13    KJ Patel  1/16/2018

## 2018-01-16 NOTE — PLAN OF CARE
Pt has been accepted to [facility] today and can transfer once paperwork is completed.  Nursing home will fax signs and symptoms form and Ms. castro.

## 2018-01-16 NOTE — PROGRESS NOTES
[facility]  Cardiology  Progress Note    Patient Name: Flo De La Torre  MRN: [id number]  Admission Date: 1/13/2018  Hospital Length of Stay: 3 days  Code Status: Full Code   Attending Physician: Kassidy Lam MD   Primary Care Physician: Paulo Fortune Jr, MD  Expected Discharge Date: 1/17/2018  Principal Problem:NSTEMI (non-ST elevated myocardial infarction)    Subjective:     Hospital Course:   Patient is s/p multi-vessel PCI including LM, proximal LAD, mid LAD, proximal LCx, mid RCA, and distal RCA.   Patient stable for step down to hospital medicine.   -01/15: afib remains. Risk Benefit discussion with family regarding fall and bleeding risk vs anticoagulation   01/16: DC to SNF    Interval History: NAEON. Patient remains in afib though asymptomatic. Likely DC to SNF today per primary team. In light of rising BUN despite improving renal clearance as well as down trending Hgb, recommendation to check stool guaiac and after transfer to check hgb q48 hours in order to screen for occult GI bleed. Have spoken with Flora Courtney NP with primary about this concern and she is in agreement.     Review of Systems   All other systems reviewed and are negative.    Objective:     Vital Signs (Most Recent):  Temp: 98.7 °F (37.1 °C) (01/16/18 0513)  Pulse: 84 (01/16/18 0600)  Resp: 16 (01/16/18 0513)  BP: 129/72 (01/16/18 0513)  SpO2: (!) 92 % (01/16/18 0513) Vital Signs (24h Range):  Temp:  [96.8 °F (36 °C)-98.9 °F (37.2 °C)] 98.7 °F (37.1 °C)  Pulse:  [77-95] 84  Resp:  [16-20] 16  SpO2:  [92 %-96 %] 92 %  BP: ()/(51-72) 129/72     Weight: 99.5 kg (219 lb 5.7 oz)  Body mass index is 28.94 kg/m².     SpO2: (!) 92 %  O2 Device (Oxygen Therapy): room air    Intake/Output Summary (Last 24 hours) at 01/16/18 0707  Last data filed at 01/16/18 0500   Gross per 24 hour   Intake              300 ml   Output             1175 ml   Net             -875 ml     Lines/Drains/Airways     Drain                  Suprapubic Catheter 01/13/13 1829 days          Peripheral Intravenous Line                 Peripheral IV - Single Lumen 01/15/18 1525 Left Forearm less than 1 day              Physical Exam   Constitutional: He appears well-developed and well-nourished.   HENT:   Head: Normocephalic and atraumatic.   Neck: Normal range of motion. Neck supple. No JVD present.   Cardiovascular: Normal rate, regular rhythm and normal heart sounds.  Exam reveals no gallop and no friction rub.    No murmur heard.  Pulses:       Femoral pulses are 2+ on the right side, and 2+ on the left side.  Pulmonary/Chest: Effort normal and breath sounds normal. He has no rales.   Abdominal: Soft. Bowel sounds are normal.   Musculoskeletal: Normal range of motion. He exhibits no lower extremity edema.   Neurological: He is alert.   Skin: Skin is warm and dry.     Significant Labs:   Recent Results (from the past 24 hour(s))   2D echo with color flow doppler    Collection Time: 01/15/18  8:00 AM   Result Value Ref Range    EF 50 55 - 65    Est. PA Systolic Pressure 28.52     Mitral Valve Mobility NORMAL    POCT glucose    Collection Time: 01/15/18  7:34 PM   Result Value Ref Range    POCT Glucose 212 (H) 70 - 110 mg/dL   Comprehensive metabolic panel    Collection Time: 01/16/18  4:15 AM   Result Value Ref Range    Sodium 140 136 - 145 mmol/L    Potassium 3.8 3.5 - 5.1 mmol/L    Chloride 105 95 - 110 mmol/L    CO2 27 23 - 29 mmol/L    Glucose 147 (H) 70 - 110 mg/dL    BUN, Bld 33 (H) 8 - 23 mg/dL    Creatinine 0.8 0.5 - 1.4 mg/dL    Calcium 8.4 (L) 8.7 - 10.5 mg/dL    Total Protein 5.8 (L) 6.0 - 8.4 g/dL    Albumin 2.9 (L) 3.5 - 5.2 g/dL    Total Bilirubin 0.6 0.1 - 1.0 mg/dL    Alkaline Phosphatase 48 (L) 55 - 135 U/L    AST 59 (H) 10 - 40 U/L    ALT 33 10 - 44 U/L    Anion Gap 8 8 - 16 mmol/L    eGFR if African American >60.0 >60 mL/min/1.73 m^2    eGFR if non African American >60.0 >60 mL/min/1.73 m^2   CBC auto differential    Collection Time:  01/16/18  4:15 AM   Result Value Ref Range    WBC 11.88 3.90 - 12.70 K/uL    RBC 3.52 (L) 4.60 - 6.20 M/uL    Hemoglobin 10.3 (L) 14.0 - 18.0 g/dL    Hematocrit 30.9 (L) 40.0 - 54.0 %    MCV 88 82 - 98 fL    MCH 29.3 27.0 - 31.0 pg    MCHC 33.3 32.0 - 36.0 g/dL    RDW 13.1 11.5 - 14.5 %    Platelets 169 150 - 350 K/uL    MPV 10.4 9.2 - 12.9 fL    Immature Granulocytes 0.3 0.0 - 0.5 %    Gran # 9.8 (H) 1.8 - 7.7 K/uL    Immature Grans (Abs) 0.04 0.00 - 0.04 K/uL    Lymph # 1.0 1.0 - 4.8 K/uL    Mono # 0.9 0.3 - 1.0 K/uL    Eos # 0.1 0.0 - 0.5 K/uL    Baso # 0.00 0.00 - 0.20 K/uL    nRBC 0 0 /100 WBC    Gran% 82.4 (H) 38.0 - 73.0 %    Lymph% 8.8 (L) 18.0 - 48.0 %    Mono% 7.7 4.0 - 15.0 %    Eosinophil% 0.8 0.0 - 8.0 %    Basophil% 0.0 0.0 - 1.9 %    Differential Method Automated    Magnesium    Collection Time: 01/16/18  4:15 AM   Result Value Ref Range    Magnesium 2.3 1.6 - 2.6 mg/dL   Phosphorus    Collection Time: 01/16/18  4:15 AM   Result Value Ref Range    Phosphorus 2.4 (L) 2.7 - 4.5 mg/dL   Protime-INR    Collection Time: 01/16/18  4:15 AM   Result Value Ref Range    Prothrombin Time 10.3 9.0 - 12.5 sec    INR 1.0 0.8 - 1.2   APTT    Collection Time: 01/16/18  4:15 AM   Result Value Ref Range    aPTT 23.1 21.0 - 32.0 sec     Significant Imaging:   CXR 01/13/2018:  Findings: Cardiac monitoring leads overlie the chest. Cardiomediastinal silhouette appears prominent with atherosclerosis and tortuosity of the thoracic aorta.  There is prominence of the pulmonary vasculature/pulmonary vascular congestion and slight increased parenchymal attenuation suggestive of pulmonary edema.  There is bibasilar subsegmental atelectasis.  No pneumothorax.     Echo w/ doppler 01/15/2018:  CONCLUSIONS     1 - Mildly depressed left ventricular systolic function (EF 50-55%).     2 - Wall motion abnormalities.     3 - Indeterminate LV diastolic function.     4 - The estimated PA systolic pressure is 32 mmHg.     Assessment and  Plan:     Brief HPI: Pt is a 81 y/o male w hx of mild dementia, hypertension, hyperlipidemia, severe three vessel CAD with prior PCTA in [place], HTN, HLD, T2DM, BPH, carotid artery Stenosis, and TIA who presented to OSH with unstable angina despite beta blocker, nitrate, and 4 baby aspirin. States CP lasted 10-15 min, radiated down arm. SOB week prior to episode. Woke up patient from sleep. While there had a LHC with  distal LMCA 90%, ostial LAD 90%, ostial LCx 90%, mid RCA 95%, and EF 55%. He was initially on nitro gtt there but on transfer that was stopped and he was on the floor here. Today he had chest pain that was refractory to sublingual nitro so cardiology was consulted. On evaluation of the patient EKG with diffuse ST depressions in inferior, anterior, and lateral leads. He was transferred to the ICU and started on nitroglycerin gtt. Which helped a bit with the chest pain but did not relieve it all the way.      Dx with stable angina 2 weeks prior, started on beta- blockade. Prior LHC in 2002 showed severe diffuse CAD with  of RCA collateralized and preserved LV fxn. Patient interactive, issues with short term memory. Able to care perform ADL's.      Echo 1/12/17: disc did not work but states EF is 75%     Functionally: shuffling walk, able to walk 1/2 mile per daughter with a cane    3-vessel CAD    Patient today with unstable angina that was refractory to nitroglycerin so was transferred to the ICU and started on a nitro gtt at 40 which helped with the pain but did not relieve it all the way. CTS evaluated the patient and his was deemed a poor candidate for CABG. The decision was made to take the patient to the cath lab. He was given solumedrol from prior dye allergy and loaded with brilinta. Multi-vessel PCI was performed with impella CP support with AMY to LM, proximal LAD, mid LAD, proximal LCx, mid RCA, and distal RCA.  -patients family making the decision to hold anticoagulation at this time 2/2  risk of bleed. This decision was discussed with Dr. Soriano.  CHADsVASC score of 7 imparts 11.2% annual risk of thromboembolic event    -Ticagrelor 90mg po BID X 1 year   - EC ASA 81mg po daily indefinitely   - Coreg 12.5mg po BID       Atrial fibrillation    lovenox while inpatient  -Family has decided against anticoagulation with coumadin vs. NOAC s/p DC given high risk of falls  -Risks benefits have been discussed at length as stated above       Essential hypertension    -Continue BP meds. Lisinopril 20, Coreg 12.5  -Discontinued Norvasc 5  -Continue home coreg 12.5mg BID      Peripheral arterial disease    Not very active but does not complain of any pain at present and no non healing ulcers at this time. Can further evaluate as an outpatient.       Dyslipidemia    High dose statin therapy.   Cholesterol panel WNL        VTE Risk Mitigation         Ordered     enoxaparin injection 40 mg  Daily     Route:  Subcutaneous        01/14/18 1042     Low Risk of VTE  Once      01/13/18 0816        Popeye Arshad MD  Cardiology  [facility]

## 2018-01-16 NOTE — ASSESSMENT & PLAN NOTE
- Chadsvasc 7/ 11.2% risk of embolic event, decision was discussed with Dr. Con Soriano, and in the setting of his chronic gait disturbance, no AC as they don't want him on triple therapy when risk of fall is high  - may consider at future date

## 2018-01-16 NOTE — ASSESSMENT & PLAN NOTE
Low dose SSI   - hgba1c only 5.8/ 120, not sure I would consider this diabetic d/t acute stress response as of late.  - recheck hgba1c in a few months

## 2018-01-16 NOTE — PLAN OF CARE
Problem: Occupational Therapy Goal  Goal: Occupational Therapy Goal  Goals to be met by: 7 days (1/22/18)     Patient will increase functional independence with ADLs by performing:    UE Dressing with Stand-by Assistance.  LE Dressing with Minimal Assistance.  Grooming while EOB with Stand-by Assistance.  Modified: Grooming while standing with SBA.  Toileting from bedside commode with Minimal Assistance for hygiene and clothing management.   Supine to sit with Contact Guard Assistance.  Toilet transfer to toilet with Contact Guard Assistance.  Pt will perform functional mobility household distance with CGA using AD as needed.     Outcome: Ongoing (interventions implemented as appropriate)  Continue OT plan of care.

## 2018-01-16 NOTE — PLAN OF CARE
Problem: Patient Care Overview  Goal: Plan of Care Review  Outcome: Ongoing (interventions implemented as appropriate)  Patient remains free from falls and injuries through out shift. Patient AA confused at  Baseline  and VSS. Patient denies chest pain and SOB. Patient's family at bedside. Patient continues on Brilinta and Coreg per MD order. BG controlled with meals and insulin.  Plan of care reviewed with patient. Patient verbalizes understanding of plan.  Will continue to monitor.

## 2018-01-16 NOTE — ASSESSMENT & PLAN NOTE
- PT/OT recommending SNF, family has place in mind, Melissa faxed paperwork.   - SNF orders need to be written, MLK Holiday and b/p dropped so not sure of full d/c plans as of yet  - on dronabinol and zofran s/e of above for dizziness

## 2018-01-16 NOTE — ASSESSMENT & PLAN NOTE
- s/p LHC with impella support as noted above  - coreg home dose was titrated aggressively and b/p dropped into 80's, no more dizzy than usual, s/p bolus NS 250ml and b/p  to 120's  - d/c'ed home norvasc and turned down coreg to 12.5mg tonight  - continue ASA, plavix, statin, brilinta, acei

## 2018-01-16 NOTE — SUBJECTIVE & OBJECTIVE
Interval History: NAEON. Patient remains in afib though asymptomatic. Likely DC to SNF today per primary team.     Review of Systems   All other systems reviewed and are negative.    Objective:     Vital Signs (Most Recent):  Temp: 98.7 °F (37.1 °C) (01/16/18 0513)  Pulse: 84 (01/16/18 0600)  Resp: 16 (01/16/18 0513)  BP: 129/72 (01/16/18 0513)  SpO2: (!) 92 % (01/16/18 0513) Vital Signs (24h Range):  Temp:  [96.8 °F (36 °C)-98.9 °F (37.2 °C)] 98.7 °F (37.1 °C)  Pulse:  [77-95] 84  Resp:  [16-20] 16  SpO2:  [92 %-96 %] 92 %  BP: ()/(51-72) 129/72     Weight: 99.5 kg (219 lb 5.7 oz)  Body mass index is 28.94 kg/m².     SpO2: (!) 92 %  O2 Device (Oxygen Therapy): room air      Intake/Output Summary (Last 24 hours) at 01/16/18 0707  Last data filed at 01/16/18 0500   Gross per 24 hour   Intake              300 ml   Output             1175 ml   Net             -875 ml       Lines/Drains/Airways     Drain                 Suprapubic Catheter 01/13/13 1829 days          Peripheral Intravenous Line                 Peripheral IV - Single Lumen 01/15/18 1525 Left Forearm less than 1 day                Physical Exam   Constitutional: He appears well-developed and well-nourished.   HENT:   Head: Normocephalic and atraumatic.   Neck: Normal range of motion. Neck supple. No JVD present.   Cardiovascular: Normal rate, regular rhythm and normal heart sounds.  Exam reveals no gallop and no friction rub.    No murmur heard.  Pulses:       Femoral pulses are 2+ on the right side, and 2+ on the left side.  Pulmonary/Chest: Effort normal and breath sounds normal. He has no rales.   Abdominal: Soft. Bowel sounds are normal.   Musculoskeletal: Normal range of motion. He exhibits edema (mild BLE).   Neurological: He is alert.   Skin: Skin is warm and dry.       Significant Labs:   Recent Results (from the past 24 hour(s))   2D echo with color flow doppler    Collection Time: 01/15/18  8:00 AM   Result Value Ref Range    EF 50 55 - 65     Est. PA Systolic Pressure 28.52     Mitral Valve Mobility NORMAL    POCT glucose    Collection Time: 01/15/18  7:34 PM   Result Value Ref Range    POCT Glucose 212 (H) 70 - 110 mg/dL   Comprehensive metabolic panel    Collection Time: 01/16/18  4:15 AM   Result Value Ref Range    Sodium 140 136 - 145 mmol/L    Potassium 3.8 3.5 - 5.1 mmol/L    Chloride 105 95 - 110 mmol/L    CO2 27 23 - 29 mmol/L    Glucose 147 (H) 70 - 110 mg/dL    BUN, Bld 33 (H) 8 - 23 mg/dL    Creatinine 0.8 0.5 - 1.4 mg/dL    Calcium 8.4 (L) 8.7 - 10.5 mg/dL    Total Protein 5.8 (L) 6.0 - 8.4 g/dL    Albumin 2.9 (L) 3.5 - 5.2 g/dL    Total Bilirubin 0.6 0.1 - 1.0 mg/dL    Alkaline Phosphatase 48 (L) 55 - 135 U/L    AST 59 (H) 10 - 40 U/L    ALT 33 10 - 44 U/L    Anion Gap 8 8 - 16 mmol/L    eGFR if African American >60.0 >60 mL/min/1.73 m^2    eGFR if non African American >60.0 >60 mL/min/1.73 m^2   CBC auto differential    Collection Time: 01/16/18  4:15 AM   Result Value Ref Range    WBC 11.88 3.90 - 12.70 K/uL    RBC 3.52 (L) 4.60 - 6.20 M/uL    Hemoglobin 10.3 (L) 14.0 - 18.0 g/dL    Hematocrit 30.9 (L) 40.0 - 54.0 %    MCV 88 82 - 98 fL    MCH 29.3 27.0 - 31.0 pg    MCHC 33.3 32.0 - 36.0 g/dL    RDW 13.1 11.5 - 14.5 %    Platelets 169 150 - 350 K/uL    MPV 10.4 9.2 - 12.9 fL    Immature Granulocytes 0.3 0.0 - 0.5 %    Gran # 9.8 (H) 1.8 - 7.7 K/uL    Immature Grans (Abs) 0.04 0.00 - 0.04 K/uL    Lymph # 1.0 1.0 - 4.8 K/uL    Mono # 0.9 0.3 - 1.0 K/uL    Eos # 0.1 0.0 - 0.5 K/uL    Baso # 0.00 0.00 - 0.20 K/uL    nRBC 0 0 /100 WBC    Gran% 82.4 (H) 38.0 - 73.0 %    Lymph% 8.8 (L) 18.0 - 48.0 %    Mono% 7.7 4.0 - 15.0 %    Eosinophil% 0.8 0.0 - 8.0 %    Basophil% 0.0 0.0 - 1.9 %    Differential Method Automated    Magnesium    Collection Time: 01/16/18  4:15 AM   Result Value Ref Range    Magnesium 2.3 1.6 - 2.6 mg/dL   Phosphorus    Collection Time: 01/16/18  4:15 AM   Result Value Ref Range    Phosphorus 2.4 (L) 2.7 - 4.5 mg/dL    Protime-INR    Collection Time: 01/16/18  4:15 AM   Result Value Ref Range    Prothrombin Time 10.3 9.0 - 12.5 sec    INR 1.0 0.8 - 1.2   APTT    Collection Time: 01/16/18  4:15 AM   Result Value Ref Range    aPTT 23.1 21.0 - 32.0 sec     Significant Imaging:   CXR 01/13/2018:  Findings: Cardiac monitoring leads overlie the chest. Cardiomediastinal silhouette appears prominent with atherosclerosis and tortuosity of the thoracic aorta.  There is prominence of the pulmonary vasculature/pulmonary vascular congestion and slight increased parenchymal attenuation suggestive of pulmonary edema.  There is bibasilar subsegmental atelectasis.  No pneumothorax.     Echo w/ doppler 01/15/2018:  CONCLUSIONS     1 - Mildly depressed left ventricular systolic function (EF 50-55%).     2 - Wall motion abnormalities.     3 - Indeterminate LV diastolic function.     4 - The estimated PA systolic pressure is 32 mmHg.

## 2018-01-16 NOTE — SUBJECTIVE & OBJECTIVE
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 Family to arrange amitiza to be sent to pharmacy downstairs so they can give it to him.        Review of Systems   Constitutional: Positive for activity change, appetite change and fatigue. Negative for chills and fever.   HENT: Negative for congestion, sore throat and trouble swallowing.    Eyes: Positive for discharge. Negative for redness and visual disturbance.   Respiratory: Positive for shortness of breath. Negative for cough and wheezing.    Cardiovascular: Negative for chest pain, palpitations and leg swelling.   Gastrointestinal: Negative for abdominal pain, constipation (no BM for a week, takes amitiza ), diarrhea, nausea (chronic d/t cerebellar ataxia and chronic dizziness) and vomiting.   Endocrine: Negative for cold intolerance and heat intolerance.   Genitourinary: Negative for difficulty urinating (incontinent, lack of bladder tone and no sphincter tone) and hematuria.        Chronic indwelling richardson for bladder atony and no sphincter tone   Musculoskeletal: Positive for gait problem (cerebellar ataxia). Negative for back pain and myalgias.   Skin: Negative for color change, rash and wound.   Allergic/Immunologic: Negative for immunocompromised state.   Neurological: Positive for dizziness (chronic from cerebellar ataxia) and weakness. Negative for light-headedness, numbness and headaches.   Hematological: Does not bruise/bleed easily.   Psychiatric/Behavioral: Positive for confusion. Negative for agitation, decreased concentration and sleep disturbance. The patient is not nervous/anxious.      Objective:     Vital Signs (Most Recent):  Temp: 96.8 °F (36 °C) (01/15/18 2026)  Pulse: 90 (01/15/18 2026)  Resp: 18 (01/15/18 2026)  BP: 120/65 (01/15/18 2026)  SpO2: (!) 93 % (01/15/18 2026) Vital Signs (24h Range):  Temp:  [96.8 °F (36 °C)-98.9 °F  (37.2 °C)] 96.8 °F (36 °C)  Pulse:  [] 90  Resp:  [8-35] 18  SpO2:  [93 %-97 %] 93 %  BP: ()/(51-86) 120/65     Weight: 99.5 kg (219 lb 5.7 oz)  Body mass index is 28.94 kg/m².    Intake/Output Summary (Last 24 hours) at 01/15/18 2049  Last data filed at 01/15/18 1600   Gross per 24 hour   Intake                0 ml   Output              895 ml   Net             -895 ml      Physical Exam   Constitutional: He is oriented to person, place, and time. He appears well-developed and well-nourished. No distress.   HENT:   Head: Normocephalic and atraumatic.   Right Ear: External ear normal.   Left Ear: External ear normal.   Nose: Nose normal.   Eyes: Conjunctivae and EOM are normal.   Neck: Normal range of motion. Neck supple. No JVD present.   Cardiovascular: Normal rate, regular rhythm, normal heart sounds and intact distal pulses.    No murmur heard.  Pulmonary/Chest: Effort normal. No respiratory distress. He has decreased breath sounds in the right lower field and the left lower field. He has no wheezes. He has no rales.   Abdominal: Soft. Bowel sounds are normal. He exhibits no distension and no mass. There is no tenderness. There is no guarding.   Genitourinary: Penis normal.   Genitourinary Comments: Chronic richardson     Musculoskeletal: Normal range of motion. He exhibits edema (+ ble ).   Neurological: He is alert and oriented to person, place, and time. No cranial nerve deficit or sensory deficit. Coordination (uses can and can only walk a few steps at a time) abnormal.   Skin: Skin is warm and dry. Capillary refill takes 2 to 3 seconds. No rash noted. He is not diaphoretic. No erythema.   Psychiatric: He has a normal mood and affect. His behavior is normal.   Nursing note and vitals reviewed.      Significant Labs:   BMP:   Recent Labs  Lab 01/15/18  0426   *      K 4.0      CO2 26   BUN 26*   CREATININE 0.9   CALCIUM 8.6*   MG 2.2     CBC:   Recent Labs  Lab 01/14/18  1924  01/15/18  0426   WBC 19.58* 13.62*   HGB 12.3* 11.0*   HCT 36.2* 32.8*    165     Coagulation:   Recent Labs  Lab 01/15/18  0426   INR 1.0   APTT 23.7       Significant Imaging: I have reviewed all pertinent imaging results/findings within the past 24 hours.

## 2018-01-16 NOTE — PLAN OF CARE
Problem: Patient Care Overview  Goal: Plan of Care Review  Outcome: Ongoing (interventions implemented as appropriate)  Discussed Plan of Care with patient. VS stable. Ambulates with 2-person or walker; uses cane at home. Fall precautions in place. Bleeding precautions reviewed and maintained. Pain denied. No plans for anticoagulation or cardioversion at this time. Will continue Lopressor for rate control. Amlodipine discontinued due to low BP. 250cc NS bolus administered. Mag Citrate x 1; BM x1. SupraPubic catheter in place. Bilat groin sites asymptomatic. Patient remained free of falls/ injury. All questions and concerns were addressed. Will continue to monitor patient.

## 2018-01-16 NOTE — ASSESSMENT & PLAN NOTE
- Not very active but does not complain of any pain at present and no non healing ulcers at this time.   - Can further evaluate as an outpatient

## 2018-01-16 NOTE — HPI
Mr. 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 States CP lasted 10-15 min, radiated down arm. SOB week prior to episode. Woke him from sleep.  He was initially on nitro gtt there but upon transfer that was stopped and he was on the floor here. [place] [facility] chest pain that was refractory to sublingual nitro so cardiology was consulted. On evaluation of his EKG it was noted diffuse ST depressions in inferior, anterior, and lateral leads. He was transferred to the ICU and started on nitroglycerin gtt, which improved pain however did not resolve it. Patient interactive, issues with short term memory. Able to care perform ADL's. Former opthalmologist. Functionally: shuffling walk, able to walk 1/2 mile per daughter with a cane.    Prior cardiac studies, previous [facility] in 2002 showed severe diffuse CAD with  of RCA collateralized and preserved LV fxn, Echo 1/12/17: disc sent from OSH did not work but states EF is 75%. The patient was admitted to the Hospital Medicine Service for further evaluation and management.

## 2018-01-16 NOTE — CONSULTS
"Cardiac Rehab     Flo De La Torre   [id number]   1/16/2018       Activity taught: Yes    Cardiac Rehab Phase Taught: Phase 1 & 2    Risk Factors-Modifiable: diabetes, nutrition, hyperlipidemia, hypertension, exercise    Risk Factors-Non modifiable: age, gender    Teaching Method: Verbal, Written, Living with Heart Disease Booklet    Understanding: Yes    Response: Patient, wife, and daughters verbalized understanding and questions answered. He will go to inpatient rehab and cardiac rehab can start when appropriate. External cardiac rehab order provided to patient.     Comments: S/P NSTEMI. Discussed cardiac rehab and risk factor modification. Team to refer patient to [facility] Cardiac Rehab Phase II in [place], [place]. Educational materials were used in the process and given to the patient. They included "Your Guide to Living with Heart Disease", Phase Two Cardiac Rehabilitation information along with a sample Mediterranean diet.The patient expressed understanding of the teaching and expressed desire to take a role in modifying the risk factors when they return home.        "

## 2018-01-17 ENCOUNTER — PATIENT OUTREACH (OUTPATIENT)
Dept: ADMINISTRATIVE | Facility: CLINIC | Age: 83
End: 2018-01-17

## 2018-01-17 PROBLEM — D64.9 ANEMIA: Status: ACTIVE | Noted: 2018-01-17

## 2018-01-17 NOTE — ASSESSMENT & PLAN NOTE
-denies pain at present and no non healing ulcers at this time  -continue statin   -further evaluate as an outpatient

## 2018-01-17 NOTE — ASSESSMENT & PLAN NOTE
-slow down trend of H/H noted, asymptomatic, no overt signs of bleeding  -SNF orders include monitoring CBC frequently and orders for occult stool  -will need outpt monitoring with PCP  -avoid any discontinuation of ASA/brilinta unless absolutely necessary

## 2018-01-17 NOTE — ASSESSMENT & PLAN NOTE
-no home medications  -no behavioral disturbances noted during hospital course  -continue follow precautions at SNF

## 2018-01-17 NOTE — PLAN OF CARE
01/17/18 0930   Final Note   Assessment Type Final Discharge Note   Discharge Disposition SNF   Hospital Follow Up  Appt(s) scheduled? Yes

## 2018-01-17 NOTE — ASSESSMENT & PLAN NOTE
-s/p LHC with impella support as noted above  -did not tolerate up titration of coreg home dose, s/p bolus NS 250ml and b/p  to 120's  -d/c'ed home norvasc and decreased coreg to 12.5 mg >>> tolerated  -continue ASA, plavix, statin, brilinta, acei   -SL NTG PRN chest pain  -outpt follow up with Cardiology  -cardiac rehab post SNF stay

## 2018-01-17 NOTE — ASSESSMENT & PLAN NOTE
-chadsvasc 7/ 11.2% risk of embolic event, decision was discussed with Dr. Con Soriano, and in the setting of his chronic gait disturbance and fall risk  -will forgo OAC   -may consider at future date   -continue rate control  -outpt follow up with Cardiology

## 2018-01-17 NOTE — ASSESSMENT & PLAN NOTE
-hypotension after coreg up titrated, small fluid challenge yesterday with improvement  -discontinued amlodipine  -continue lower dose coreg and ACEi

## 2018-01-17 NOTE — PHYSICIAN QUERY
PT Name: Flo De La Torre  MR #: [id number]    Physician Query Form - Atrial Fibrillation Specificity     CDS/: Kate Vines RN, CCDS               Contact information:[email or web address]     This form is a permanent document in the medical record.     Query Date: January 17, 2018    By submitting this query, we are merely seeking further clarification of documentation. Please utilize your independent clinical judgment when addressing the question(s) below.    The medical record contains the following:   Indicators     Supporting Clinical Findings Location in Medical Record   X Atrial Fibrillation  noted to be in atrial fibrillation on telemetry  Remains in atrial fibrillation, rate controlled 1/15 prog note   1/15 cards note   X EKG results Atrial fibrillation 1/15 ekg    Medication      Treatment     X Other is now a need for anticoagulation for new onset Afib. Patients family has made the decision that the risk of falling with resulting bleed would be the most intolerable outcome, as such, they have decided not to move forward with anticoagulation at this time. 1/15 prog note       Provider, please further specify the Atrial Fibrillation diagnosis.    [ X ] Paroxysmal  [  ] Other (please specify): ____________________________  [  ] Clinically Undetermined    Please document in your progress notes daily for the duration of treatment until resolved, and include in your discharge summary.

## 2018-01-17 NOTE — ASSESSMENT & PLAN NOTE
-PT/OT followed, recommending SNF, placement confirmed at patients/families choice  -fall precautions   -continue home PRN dronabinol and zofran

## 2018-01-17 NOTE — PT/OT/SLP DISCHARGE
Occupational Therapy Discharge Summary    Flo De La Torre  MRN: [id number]   Principal Problem: NSTEMI (non-ST elevated myocardial infarction)      Patient Discharged from acute Occupational Therapy on 1/16/18.  Please refer to prior OT note dated 1/16/18 for functional status.    Assessment:      Patient appropriate for care in another setting.    Objective:     GOALS:    Occupational Therapy Goals        Problem: Occupational Therapy Goal    Goal Priority Disciplines Outcome Interventions   Occupational Therapy Goal     OT, PT/OT Ongoing (interventions implemented as appropriate)    Description:  Goals to be met by: 7 days (1/22/18)     Patient will increase functional independence with ADLs by performing:    UE Dressing with Stand-by Assistance.  LE Dressing with Minimal Assistance.  Grooming while EOB with Stand-by Assistance.  Modified: Grooming while standing with SBA.  Toileting from bedside commode with Minimal Assistance for hygiene and clothing management.   Supine to sit with Contact Guard Assistance.  Toilet transfer to toilet with Contact Guard Assistance.  Pt will perform functional mobility household distance with CGA using AD as needed.                       Reasons for Discontinuation of Therapy Services  Transfer to alternate level of care.      Plan:     Patient Discharged to: Skilled Nursing Facility    KJ Patel  1/17/2018

## 2018-01-17 NOTE — PHYSICIAN QUERY
"PT Name: Flo De La Torre  MR #: [id number]    Physician Query Form - Hematology Clarification      CDS/: Kate Vines RN, CCDS              Contact information: [email or web address]    This form is a permanent document in the medical record.      Query Date: January 17, 2018    By submitting this query, we are merely seeking further clarification of documentation. Please utilize your independent clinical judgment when addressing the question(s) below.    The Medical record contains the following:   Indicators  Supporting Clinical Findings Location in Medical Record   X "Anemia" documented Anemia  slow down trend of H/H noted, asymptomatic, no overt signs of bleeding 1/16 d/c summary   X H & H = 14.6/43.8-->11/32.8-->10.3/30.9 1/13, 1/15, 1/16 lab    BP =                     HR=      "GI bleeding" documented      Acute bleeding (Non GI site)      Transfusion(s)     X Treatment: SNF orders include monitoring CBC frequently and orders for occult stool  -will need outpt monitoring with PCP 1/16 d/c summary    Other:        Provider, please specify diagnosis or diagnoses associated with above clinical findings.      [  ] Precipitous drop in Hematocrit    [  ] Other Hematological Diagnosis (please specify): _________________________________    [ X ] Clinically Undetermined       Please document in your progress notes daily for the duration of treatment, until resolved, and include in your discharge summary.                                                                                                      "

## 2018-01-17 NOTE — DISCHARGE SUMMARY
[facility] Hospital Medicine  Discharge Summary      Patient Name: Flo De La Torre  MRN: [id number]  Admission Date: 1/13/2018  Hospital Length of Stay: 3 days  Discharge Date and Time: 1/16/2018  4:53 PM  Attending Physician: No att. providers found   Discharging Provider: Flora Courtney NP  Primary Care Provider: Paulo Fortune Jr, MD  [facility] Medicine Team: [facility] HOSP MED  Flora Courtney NP    HPI:   Mr. De La Torre is an 82 year old male with past medical history of mild dementia, hypertension, hyperlipidemia, severe triple vessal CAD with prior PTCA in [facility] 1/11/18 (distal LMCA 90%, oLAD 90%, oLcx 90%, mRCA 95%, EF 55%), HTN, HLD, T2DM, BPH, bladder atony w/ chronic richardson, carotid artery stenosis, and TIA who presented to [facility] with unstable angina despite beta blocker, nitrate, and 4 baby aspirin. States CP lasted 10-15 min, radiated down arm. SOB week prior to episode. Woke him from sleep.  He was initially on nitro gtt there but upon transfer that was stopped and he was on the floor here. Upon presentation to [facility], he had chest pain that was refractory to sublingual nitro so cardiology was consulted. On evaluation of his EKG it was noted diffuse ST depressions in inferior, anterior, and lateral leads. He was transferred to the ICU and started on nitroglycerin gtt, which improved pain however did not resolve it. Patient interactive, issues with short term memory. Able to care perform ADL's. Former opthalmologist. Functionally: shuffling walk, able to walk 1/2 mile per daughter with a cane.    Prior cardiac studies, previous [facility] in 2002 showed severe diffuse CAD with  of RCA collateralized and preserved LV fxn, Echo 1/12/17: disc sent from OSH did not work but states EF is 75%. The patient was admitted to the Hospital Medicine Service for further evaluation and management.     Procedure(s) (LRB):  Coronary angiography (N/A)      Hospital  Course:   Mr. De La Torre was admitted 1/13/18 from [facility] then immediately transferred to CCU due to refractory chest pain and initiated on NTG gtt. During CCU stay he was evaluated by CTS and deemed a poor candidate for CABG. It was then decided he was a high risk PCI candidate and he's now s/p LHC on 1/3/18.  He had AMY placed to LM, proximal LAD, mid LAD, proximal LCx, mid RCA, and distal RCA. Patient transferred to step down on 1/14/18 and hospital medicine took over service on 1/15/18.  During transition on 1/14/18 he went into afib with controlled rate.  It was discussed with Cardiology co-managed and his family that with his current unstable shuffling gait and fall risk, that they would forgo addition of OAC and continue rate control  As for his unsteady gait, PT recommended SNF, he was accepted to his first choice facility and SW facilitated.     Disposition: SNF with family transport, outpt follow up with Cardiology     Consults:   Consults         Status Ordering Provider     Inpatient consult to Cardiac Rehab  Once     Provider:  (Not yet assigned)    Completed ZION VELEZ     Inpatient consult to Cardiology  Once     Provider:  (Not yet assigned)    Completed JOSIANE SANDOVAL          * NSTEMI (non-ST elevated myocardial infarction)-resolved as of 1/16/2018    -s/p [facility] with impella support as noted above  -did not tolerate up titration of coreg home dose, s/p bolus NS 250ml and b/p  to 120's  -d/c'ed home norvasc and decreased coreg to 12.5 mg >>> tolerated  -continue ASA, plavix, statin, brilinta, acei   -SL NTG PRN chest pain  -outpt follow up with Cardiology  -cardiac rehab post SNF stay        Unstable angina-resolved as of 1/13/2018    -see above and HPI        3-vessel CAD    -see above        S/P drug eluting coronary stent placement    -transitioned to brilinta         Essential hypertension    -hypotension after coreg up titrated, small fluid challenge yesterday  with improvement  -discontinued amlodipine  -continue lower dose coreg and ACEi        Atrial fibrillation    -chadsvasc 7/ 11.2% risk of embolic event, decision was discussed with Dr. Con Soriano, and in the setting of his chronic gait disturbance and fall risk  -will forgo OAC   -may consider at future date   -continue rate control  -outpt follow up with Cardiology         Dyslipidemia    -continue statin         Peripheral arterial disease    -denies pain at present and no non healing ulcers at this time  -continue statin   -further evaluate as an outpatient        Type 2 diabetes mellitus    -resume home glimepiride  -HgA1C 5.8/ 120  -further monitoring/evaluation per PCP         Cerebellar ataxia    -PT/OT followed, recommending SNF, placement confirmed at patients/families choice  -fall precautions   -continue home PRN dronabinol and zofran        Dementia    -no home medications  -no behavioral disturbances noted during hospital course  -continue follow precautions at SNF        Transaminitis    -resolving post PCI  -continue monitoring at SNF         Anemia    -slow down trend of H/H noted, asymptomatic, no overt signs of bleeding  -SNF orders include monitoring CBC frequently and orders for occult stool  -will need outpt monitoring with PCP  -avoid any discontinuation of ASA/brilinta unless absolutely necessary           Final Active Diagnoses:    Diagnosis Date Noted POA    3-vessel CAD [I25.10] 01/12/2018 Yes    S/P drug eluting coronary stent placement [Z95.5] 01/13/2018 Not Applicable    Essential hypertension [I10] 01/13/2018 Yes    Atrial fibrillation [I48.91] 01/15/2018 No    Dyslipidemia [E78.5] 01/13/2018 Yes    Peripheral arterial disease [I73.9] 01/13/2018 Yes    Type 2 diabetes mellitus [E11.9] 01/13/2018 Yes    Cerebellar ataxia [G11.9] 01/13/2018 Yes    Dementia [F03.90] 01/13/2018 Yes    Transaminitis [R74.0] 01/15/2018 Yes    Anemia [D64.9] 01/16/2018 Unknown      Problems Resolved  During this Admission:    Diagnosis Date Noted Date Resolved POA    PRINCIPAL PROBLEM:  NSTEMI (non-ST elevated myocardial infarction) [I21.4] 01/13/2018 01/16/2018 Yes    Unstable angina [I20.0] 01/13/2018 01/13/2018 Yes     Discharged Condition: fair    Disposition: Skilled Nursing Facility    Follow Up:  Follow-up Information     REHAB, CARDIAC.    Specialty:  Cardiology           [facility]ARDIOLOGY. Schedule an appointment as soon as possible for a visit in 3 weeks.    Specialty:  Cardiology  Why:  post hospital follow up   Contact information:  [address] [facility] [address]  [phone]           Paulo Fortune Jr, MD. Schedule an appointment as soon as possible for a visit in 2 weeks.    Specialty:  Internal Medicine  Why:  post hospital follow up  Contact information:  [address] [address]  [phone]             Issa Bhatti MD. Schedule an appointment as soon as possible for a visit in 1 month.    Specialties:  Cardiology, INTERVENTIONAL CARDIOLOGY  Why:  post cardiac intervention follow up   Contact information:  [address] [facility] [address]  [phone]             [facility].    Contact information:  [address] [facility] [address] [phone]               Patient Instructions:     Ambulatory referral to Cardiology   Referral Priority: Routine Referral Type: Consultation   Referral Reason: Specialty Services Required    Referred to Provider: ISSA BHATTI Requested Specialty: Cardiology   Number of Visits Requested: 1      Diet Cardiac     Diet diabetic     Activity as tolerated     Notify your health care provider if you experience any of the following:  temperature >100.4     Notify your health care provider if you experience any of the following:  persistent nausea and vomiting or diarrhea     Notify your health care provider if you experience any of the following:  severe uncontrolled  pain     Notify your health care provider if you experience any of the following:  redness, tenderness, or signs of infection (pain, swelling, redness, odor or green/yellow discharge around incision site)     Notify your health care provider if you experience any of the following:  difficulty breathing or increased cough     Notify your health care provider if you experience any of the following:  severe persistent headache     Notify your health care provider if you experience any of the following:  worsening rash     Notify your health care provider if you experience any of the following:  increased confusion or weakness     Cardiac Rehab Phase II   Standing Status: Future  Standing Exp. Date: 01/15/19   Order Specific Question Answer Comments   Select qualifying diagnosis: I21.4 - Non-ST elevation (NSTEMI) myocardial infarction      Review of Systems   Constitutional: Positive for activity change, appetite change and fatigue. Negative for chills and fever.   HENT: Negative for congestion, sore throat and trouble swallowing.    Respiratory: Negative for shortness of breath, cough and wheezing.    Cardiovascular: Negative for chest pain, palpitations and leg swelling.   Gastrointestinal: Negative for abdominal pain, constipation (no BM for a week, takes amitiza ), diarrhea, nausea (chronic d/t cerebellar ataxia and chronic dizziness) and vomiting.   Endocrine: Negative for cold intolerance and heat intolerance.   Genitourinary: Negative for difficulty urinating (incontinent, lack of bladder tone and no sphincter tone) and hematuria.        Chronic indwelling richardson for bladder atony and no sphincter tone   Musculoskeletal: Positive for gait problem (cerebellar ataxia). Negative for back pain and myalgias.   Skin: Negative for color change, rash and wound.   Allergic/Immunologic: Negative for immunocompromised state.   Neurological: Positive for dizziness (chronic from cerebellar ataxia) and weakness. Negative for  light-headedness, numbness and headaches.   Hematological: Does not bruise/bleed easily.   Psychiatric/Behavioral: Positive for confusion. Negative for agitation, decreased concentration and sleep disturbance. The patient is not nervous/anxious.      Physical Exam   Constitutional: He is oriented to person, place, and time. He appears well-developed and well-nourished. No distress.   HENT:   Head: Normocephalic and atraumatic.   Eyes: Conjunctivae and EOM are normal. PERRL.  Neck: Normal range of motion. Neck supple. No JVD present.   Cardiovascular: Normal rate, regular rhythm, normal heart sounds and intact distal pulses.    No murmur heard.  Pulmonary/Chest: Effort normal. No respiratory distress. He has decreased breath sounds in the right lower field and the left lower field. He has no wheezes. He has no rales.   Abdominal: Soft. Bowel sounds are normal. He exhibits no distension and no mass. There is no tenderness. There is no guarding.   Genitourinary: Penis normal.   Genitourinary Comments: Chronic richardson  Musculoskeletal: Normal range of motion. He exhibits edema (+ ble ).   Neurological: He is alert and oriented to person, place, and time. No cranial nerve deficit or sensory deficit. Coordination (uses can and can only walk a few steps at a time) abnormal.   Skin: Skin is warm and dry. Capillary refill takes 2 to 3 seconds. No rash noted. He is not diaphoretic. No erythema.   Psychiatric: He has a normal mood and affect. His behavior is normal.   Nursing note and vitals reviewed.     Significant Diagnostic Studies: Labs:   CMP   Recent Labs  Lab 01/15/18  0426 01/16/18  0415 01/16/18  1418    140 139   K 4.0 3.8 4.1    105 104   CO2 26 27 28   * 147* 178*   BUN 26* 33* 30*   CREATININE 0.9 0.8 0.9   CALCIUM 8.6* 8.4* 8.7   PROT 6.0 5.8*  --    ALBUMIN 3.0* 2.9* 3.0*   BILITOT 0.8 0.6  --    ALKPHOS 50* 48*  --    * 59*  --    ALT 37 33  --    ANIONGAP 5* 8 7*   ESTGFRAFRICA >60.0  >60.0 >60.0   EGFRNONAA >60.0 >60.0 >60.0   , CBC   Recent Labs  Lab 01/15/18  0426 01/16/18  0415 01/16/18  1418   WBC 13.62* 11.88  --    HGB 11.0* 10.3* 10.7*   HCT 32.8* 30.9* 31.9*    169  --    , Lipid Panel   Lab Results   Component Value Date    CHOL 159 01/13/2018    HDL 50 01/13/2018    LDLCALC 94.8 01/13/2018    TRIG 71 01/13/2018    CHOLHDL 31.4 01/13/2018   , Troponin   Recent Labs  Lab 01/13/18  1238   TROPONINI 1.826*   , A1C:   Recent Labs  Lab 01/13/18  2253   HGBA1C 5.8*    and All labs within the past 24 hours have been reviewed    Pending Diagnostic Studies:     None         Medications:  Reconciled Home Medications:   Discharge Medication List as of 1/16/2018  3:43 PM      CONTINUE these medications which have CHANGED    Details   carvedilol (COREG) 12.5 MG tablet Take 1 tablet (12.5 mg total) by mouth 2 (two) times daily., Starting Tue 1/16/2018, Until Wed 1/16/2019, Normal      dronabinol (MARINOL) 2.5 MG capsule Take 1 capsule (2.5 mg total) by mouth 2 (two) times daily before meals., Starting Tue 1/16/2018, Print      phosphorated carbohydrate (EMETROL) Soln Take 15 mLs by mouth every 15 (fifteen) minutes as needed. until distress subsides; do not take for more than 1 hour (5 doses), Starting Tue 1/16/2018, Normal      ticagrelor (BRILINTA) 90 mg tablet Take 1 tablet (90 mg total) by mouth 2 (two) times daily., Starting Tue 1/16/2018, Until Wed 1/16/2019, Normal         CONTINUE these medications which have NOT CHANGED    Details   aspirin (ECOTRIN) 81 MG EC tablet Take 81 mg by mouth once daily., Historical Med      cyanocobalamin (VITAMIN B-12) 100 MCG tablet Take 1,000 mcg by mouth once daily., Historical Med      glimepiride (AMARYL) 2 MG tablet Take 2 mg by mouth before breakfast., Historical Med      lisinopril (PRINIVIL,ZESTRIL) 20 MG tablet Take 20 mg by mouth once daily., Historical Med      lubiprostone (AMITIZA) 24 MCG Cap Take 24 mcg by mouth 2 (two) times daily with  meals., Historical Med      MULTIVIT-IRON-MIN-FOLIC ACID 3,500-18-0.4 UNIT-MG-MG ORAL CHEW Take by mouth., Historical Med      multivitamin (THERAGRAN) tablet Take 1 tablet by mouth once daily., Historical Med      nitroGLYCERIN (NITROSTAT) 0.4 MG SL tablet Place 0.4 mg under the tongue every 5 (five) minutes as needed for Chest pain., Historical Med      pantoprazole (PROTONIX) 40 MG tablet Take 40 mg by mouth once daily., Historical Med      pyridoxine, vitamin B6, (VITAMIN B-6) 100 MG Tab Take 100 mg by mouth once daily., Historical Med      rosuvastatin (CRESTOR) 10 MG tablet Take 10 mg by mouth once daily., Historical Med         STOP taking these medications       amLODIPine (NORVASC) 5 MG tablet Comments:   Reason for Stopping:         isosorbide mononitrate (IMDUR) 30 MG 24 hr tablet Comments:   Reason for Stopping:             Indwelling Lines/Drains at time of discharge:   Lines/Drains/Airways          No matching active lines, drains, or airways        Time spent on the discharge of patient: 45 minutes  Patient was seen and examined on the date of discharge and determined to be suitable for discharge.      Flora Courtney NP  Department of Hospital Medicine  [facility]

## 2018-02-01 ENCOUNTER — OFFICE VISIT (OUTPATIENT)
Dept: CARDIOLOGY | Facility: CLINIC | Age: 83
End: 2018-02-01
Payer: MEDICARE

## 2018-02-01 VITALS
HEIGHT: 73 IN | SYSTOLIC BLOOD PRESSURE: 137 MMHG | OXYGEN SATURATION: 99 % | BODY MASS INDEX: 27.23 KG/M2 | WEIGHT: 205.5 LBS | HEART RATE: 58 BPM | DIASTOLIC BLOOD PRESSURE: 62 MMHG

## 2018-02-01 DIAGNOSIS — I48.0 PAROXYSMAL ATRIAL FIBRILLATION: Primary | ICD-10-CM

## 2018-02-01 DIAGNOSIS — I48.91 ATRIAL FIBRILLATION AND FLUTTER: ICD-10-CM

## 2018-02-01 DIAGNOSIS — E78.5 DYSLIPIDEMIA: ICD-10-CM

## 2018-02-01 DIAGNOSIS — I10 ESSENTIAL HYPERTENSION: ICD-10-CM

## 2018-02-01 DIAGNOSIS — I25.10 CORONARY ARTERY DISEASE INVOLVING NATIVE CORONARY ARTERY OF NATIVE HEART WITHOUT ANGINA PECTORIS: ICD-10-CM

## 2018-02-01 DIAGNOSIS — I73.9 PERIPHERAL ARTERIAL DISEASE: ICD-10-CM

## 2018-02-01 DIAGNOSIS — G11.9 CEREBELLAR ATAXIA: ICD-10-CM

## 2018-02-01 DIAGNOSIS — I48.92 ATRIAL FIBRILLATION AND FLUTTER: ICD-10-CM

## 2018-02-01 DIAGNOSIS — Z95.5 S/P DRUG ELUTING CORONARY STENT PLACEMENT: ICD-10-CM

## 2018-02-01 DIAGNOSIS — E11.59 TYPE 2 DIABETES MELLITUS WITH OTHER CIRCULATORY COMPLICATION, WITHOUT LONG-TERM CURRENT USE OF INSULIN: ICD-10-CM

## 2018-02-01 PROCEDURE — 99999 PR PBB SHADOW E&M-EST. PATIENT-LVL V: CPT | Mod: PBBFAC,,, | Performed by: INTERNAL MEDICINE

## 2018-02-01 PROCEDURE — 99215 OFFICE O/P EST HI 40 MIN: CPT | Mod: PBBFAC | Performed by: INTERNAL MEDICINE

## 2018-02-01 PROCEDURE — 1126F AMNT PAIN NOTED NONE PRSNT: CPT | Mod: ,,, | Performed by: INTERNAL MEDICINE

## 2018-02-01 PROCEDURE — 99214 OFFICE O/P EST MOD 30 MIN: CPT | Mod: S$PBB,,, | Performed by: INTERNAL MEDICINE

## 2018-02-01 PROCEDURE — 1159F MED LIST DOCD IN RCRD: CPT | Mod: ,,, | Performed by: INTERNAL MEDICINE

## 2018-02-01 RX ORDER — MECLIZINE HYDROCHLORIDE CHEWABLE TABLETS 25 MG/1
32 TABLET, CHEWABLE ORAL 3 TIMES DAILY PRN
COMMUNITY

## 2018-02-01 NOTE — LETTER
February 4, 2018      Grace Mcgarry, NP  [address]  [facility] [address]           [facility]  [address]  [facility] [address]  Phone: [phone]          Patient: Flo De La Torre   MR Number: [id number]   YOB: 1935   Date of Visit: 2/1/2018       Dear Grace Mcgarry:    Thank you for referring Flo De La Torre to me for evaluation. Attached you will find relevant portions of my assessment and plan of care.    If you have questions, please do not hesitate to call me. I look forward to following Flo De La Torre along with you.    Sincerely,    Dano Soriano MD    Enclosure  CC:  No Recipients    If you would like to receive this communication electronically, please contact [email or web address] or [phone] to request more information on [employer] Link access.    For providers and/or their staff who would like to refer a patient to [facility], please contact us through our one-stop-shop provider referral line, [facility], at [phone].    If you feel you have received this communication in error or would no longer like to receive these types of communications, please e-mail [email or web address]

## 2018-02-04 PROBLEM — I25.10 3-VESSEL CAD: Status: RESOLVED | Noted: 2018-01-12 | Resolved: 2018-02-04

## 2018-02-04 PROBLEM — I25.10 CORONARY ARTERY DISEASE INVOLVING NATIVE CORONARY ARTERY OF NATIVE HEART WITHOUT ANGINA PECTORIS: Status: ACTIVE | Noted: 2018-02-04

## 2018-02-04 NOTE — PROGRESS NOTES
Subjective:    Patient ID:  Flo De La Torre is a 82 y.o. 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  He was deemd to be at prohibitve risk for CABG.  Due to recurrent angina that was refractory to medical therpay he underwent emergent mutli-vessel PCI on 1/13/18.  Since his PCI he denies angina.  He denies exertional dyspnea.  He denies LE edema, orthopnea, or PND.  He denies palpitations, syncope or near syncope.  He reports good medication compliance. He denies any bleeding problems.    Past Medical History:   Diagnosis Date    3-vessel CAD     Alzheimer disease     Ataxia     BPH (benign prostatic hyperplasia)     Carotid stenosis     Cerebellar ataxia     Chronic nausea     HLD (hyperlipidemia)     HTN (hypertension)     TIA (transient ischemic attack)     Type 2 diabetes mellitus      Past Surgical History:   Procedure Laterality Date    APPENDECTOMY      CARDIAC CATHETERIZATION      malignant melanoma        Current Outpatient Prescriptions on File Prior to Visit   Medication Sig Dispense Refill    aspirin (ECOTRIN) 81 MG EC tablet Take 81 mg by mouth once daily.      carvedilol (COREG) 12.5 MG tablet Take 1 tablet (12.5 mg total) by mouth 2 (two) times daily. 60 tablet 11    cyanocobalamin (VITAMIN B-12) 100 MCG tablet Take 1,000 mcg by mouth once daily.      dronabinol (MARINOL) 2.5 MG capsule Take 1 capsule (2.5 mg total) by mouth 2 (two) times daily before meals. 30 capsule 5    glimepiride (AMARYL) 2 MG tablet Take 2 mg by mouth before breakfast.      lisinopril (PRINIVIL,ZESTRIL) 20 MG tablet Take 20 mg by mouth once daily.      lubiprostone (AMITIZA) 24 MCG Cap Take 24 mcg by mouth 2 (two) times daily with meals.      MULTIVIT-IRON-MIN-FOLIC ACID 3,500-18-0.4 UNIT-MG-MG ORAL CHEW Take by mouth.       multivitamin (THERAGRAN) tablet Take 1 tablet by mouth once daily.      nitroGLYCERIN (NITROSTAT) 0.4 MG SL tablet Place 0.4 mg under the tongue every 5 (five) minutes as needed for Chest pain.      pantoprazole (PROTONIX) 40 MG tablet Take 40 mg by mouth once daily.      phosphorated carbohydrate (EMETROL) Soln Take 15 mLs by mouth every 15 (fifteen) minutes as needed. until distress subsides; do not take for more than 1 hour (5 doses) 118 mL 11    pyridoxine, vitamin B6, (VITAMIN B-6) 100 MG Tab Take 100 mg by mouth once daily.      rosuvastatin (CRESTOR) 10 MG tablet Take 10 mg by mouth once daily.      ticagrelor (BRILINTA) 90 mg tablet Take 1 tablet (90 mg total) by mouth 2 (two) times daily. 60 tablet 11     No current facility-administered medications on file prior to visit.      Review of patient's allergies indicates:   Allergen Reactions    Iodine and iodide containing products Anaphylaxis     Social History   Substance Use Topics    Smoking status: Former Smoker    Smokeless tobacco: Never Used    Alcohol use Yes      Comment: wine     No family history on file.    Review of Systems   Constitution: Negative for decreased appetite, diaphoresis, fever, malaise/fatigue, weight gain and weight loss.   HENT: Negative for congestion, nosebleeds and sore throat.    Eyes: Negative for blurred vision, vision loss in left eye, vision loss in right eye and visual disturbance.   Cardiovascular: Negative for chest pain, claudication, dyspnea on exertion, leg swelling, near-syncope, orthopnea, palpitations, paroxysmal nocturnal dyspnea and syncope.   Respiratory: Negative for cough, hemoptysis, shortness of breath and wheezing.    Endocrine: Negative for polyuria.   Hematologic/Lymphatic: Does not bruise/bleed easily.   Skin: Negative for nail changes and rash.   Musculoskeletal: Positive for muscle weakness. Negative for back pain, muscle cramps and myalgias.   Gastrointestinal: Negative for abdominal pain,  "change in bowel habit, diarrhea, heartburn, hematemesis, hematochezia, melena, nausea and vomiting.   Genitourinary: Negative for bladder incontinence, dysuria, frequency and hematuria.   Psychiatric/Behavioral: Negative for depression.   Allergic/Immunologic: Negative for hives.        Objective:  Vitals:    02/01/18 1358 02/01/18 1402   BP: 131/77 137/62   BP Location: Left arm Right arm   Patient Position: Sitting Sitting   BP Method: Large (Automatic) Large (Automatic)   Pulse: (!) 58 (!) 58   SpO2: 99% 99%   Weight: 93.2 kg (205 lb 7.5 oz)    Height: 6' 1" (1.854 m)          Physical Exam   Constitutional: He is oriented to person, place, and time. He appears well-developed and well-nourished.   HENT:   Head: Normocephalic and atraumatic.   Eyes: EOM are normal. Pupils are equal, round, and reactive to light.   Neck: Neck supple. No JVD present. No thyromegaly present.   Cardiovascular: Normal rate and regular rhythm.  PMI is displaced.  Exam reveals no gallop and no friction rub.    No murmur heard.  Pulses:       Carotid pulses are 2+ on the right side, and 2+ on the left side.       Radial pulses are 2+ on the right side, and 2+ on the left side.        Femoral pulses are 2+ on the right side, and 2+ on the left side.       Dorsalis pedis pulses are 2+ on the right side, and 2+ on the left side.        Posterior tibial pulses are 2+ on the right side, and 2+ on the left side.   Pulmonary/Chest: Effort normal. He has no wheezes. He has no rhonchi. He has no rales.   Abdominal: Soft. Normal appearance. He exhibits no distension. There is no hepatosplenomegaly. There is no tenderness.   Neurological: He is alert and oriented to person, place, and time. Coordination and gait abnormal.   Skin: Skin is warm and dry. No rash noted. No erythema.   Psychiatric: He has a normal mood and affect.         Assessment:       1. Paroxysmal atrial fibrillation    2. Atrial fibrillation and flutter    3. Peripheral arterial " disease    4. Essential hypertension    5. Dyslipidemia    6. Cerebellar ataxia    7. S/P drug eluting coronary stent placement    8. Type 2 diabetes mellitus with other circulatory complication, without long-term current use of insulin    9. Coronary artery disease involving native coronary artery of native heart without angina pectoris         Plan:       1) CAD.  The patient is free of anginal and CHF symptoms. Continue medical therpay and start cardaic rehab  -continue EC ASA 81mg poq day  -continue Ticagrelor 90mg po BID  -continue Coreg  -continue lisinopril  -start phase 2 cardaic rehab    2) Atrail fibrillation.  The patient's CHADS2-Vasc score andrisk fo stroke as well as his h/o falls and HAS-BLED score were discussed with the patient and his faimily in detail in the hospital and today in clinic.  He had opted to not take anti-coagulation for stroke prevention due to his fall risk.  He is interested in ZEB occlusion.  Therefore the patient was introduced to Dr. Adkins who will commence work-up for a possible ZEB occlusion procedure    3) Dyslipidemia. 1/13/18 lipid panel reviewed; Continue [id number] po qday; rec heart healthy diet    4) DM2. 1/13/18 OdbA3w=2.8; agree with tight glycemic control    5) HTN. Blood pressure adequately controlled on current medications    6) H/o Cerebellar ataxia. constanza was commendedonusing his walker; fall precautions advised as patient is on DAPT    7) GI prophylaxis. continue PPI while on DAPT      All of the patient's and his family's  questions were answered.

## 2018-02-12 ENCOUNTER — PATIENT OUTREACH (OUTPATIENT)
Dept: ADMINISTRATIVE | Facility: CLINIC | Age: 83
End: 2018-02-12

## 2018-02-15 NOTE — PATIENT INSTRUCTIONS

## 2018-02-19 ENCOUNTER — PATIENT MESSAGE (OUTPATIENT)
Dept: CARDIOLOGY | Facility: CLINIC | Age: 83
End: 2018-02-19

## 2018-03-06 ENCOUNTER — DOCUMENTATION ONLY (OUTPATIENT)
Dept: CARDIOLOGY | Facility: CLINIC | Age: 83
End: 2018-03-06

## 2018-03-06 NOTE — PROGRESS NOTES
Patient was a no show for CTA for LAAO evaluation on 2/26/18. Called to reschedule . LM with my direct number. Await his return call.

## 2018-05-30 ENCOUNTER — TELEPHONE (OUTPATIENT)
Dept: CARDIOLOGY | Facility: CLINIC | Age: 83
End: 2018-05-30

## 2018-06-26 ENCOUNTER — LAB VISIT (OUTPATIENT)
Dept: LAB | Facility: HOSPITAL | Age: 83
End: 2018-06-26
Attending: FAMILY MEDICINE
Payer: MEDICARE

## 2018-06-26 ENCOUNTER — PATIENT MESSAGE (OUTPATIENT)
Dept: FAMILY MEDICINE | Facility: CLINIC | Age: 83
End: 2018-06-26

## 2018-06-26 DIAGNOSIS — E78.5 HYPERLIPEMIA: Primary | ICD-10-CM

## 2018-06-26 LAB
ALBUMIN SERPL BCP-MCNC: 4 G/DL
ALBUMIN SERPL BCP-MCNC: 4 G/DL
ALP SERPL-CCNC: 42 U/L
ALP SERPL-CCNC: 42 U/L
ALT SERPL W/O P-5'-P-CCNC: 17 U/L
ALT SERPL W/O P-5'-P-CCNC: 17 U/L
ANION GAP SERPL CALC-SCNC: 9 MMOL/L
AST SERPL-CCNC: 19 U/L
AST SERPL-CCNC: 19 U/L
BILIRUB DIRECT SERPL-MCNC: 0.2 MG/DL
BILIRUB SERPL-MCNC: 0.4 MG/DL
BILIRUB SERPL-MCNC: 0.4 MG/DL
BNP SERPL-MCNC: 73 PG/ML
BUN SERPL-MCNC: 31 MG/DL
CALCIUM SERPL-MCNC: 9.4 MG/DL
CHLORIDE SERPL-SCNC: 99 MMOL/L
CHOLEST SERPL-MCNC: 121 MG/DL
CHOLEST/HDLC SERPL: 2.4 {RATIO}
CO2 SERPL-SCNC: 25 MMOL/L
CREAT SERPL-MCNC: 1.4 MG/DL
ERYTHROCYTE [DISTWIDTH] IN BLOOD BY AUTOMATED COUNT: 15 %
EST. GFR  (AFRICAN AMERICAN): 53.7 ML/MIN/1.73 M^2
EST. GFR  (NON AFRICAN AMERICAN): 46.5 ML/MIN/1.73 M^2
GLUCOSE SERPL-MCNC: 157 MG/DL
HCT VFR BLD AUTO: 33.7 %
HDLC SERPL-MCNC: 50 MG/DL
HDLC SERPL: 41.3 %
HGB BLD-MCNC: 11.3 G/DL
LDLC SERPL CALC-MCNC: 57 MG/DL
MCH RBC QN AUTO: 28.9 PG
MCHC RBC AUTO-ENTMCNC: 33.5 G/DL
MCV RBC AUTO: 86 FL
NONHDLC SERPL-MCNC: 71 MG/DL
PLATELET # BLD AUTO: 203 K/UL
PMV BLD AUTO: 10 FL
POTASSIUM SERPL-SCNC: 4.2 MMOL/L
PROT SERPL-MCNC: 7 G/DL
PROT SERPL-MCNC: 7 G/DL
RBC # BLD AUTO: 3.91 M/UL
SODIUM SERPL-SCNC: 133 MMOL/L
TRIGL SERPL-MCNC: 70 MG/DL
WBC # BLD AUTO: 6.75 K/UL

## 2018-06-26 PROCEDURE — 36415 COLL VENOUS BLD VENIPUNCTURE: CPT

## 2018-06-26 PROCEDURE — 85027 COMPLETE CBC AUTOMATED: CPT

## 2018-06-26 PROCEDURE — 83880 ASSAY OF NATRIURETIC PEPTIDE: CPT

## 2018-06-26 PROCEDURE — 80061 LIPID PANEL: CPT

## 2018-06-26 PROCEDURE — 80053 COMPREHEN METABOLIC PANEL: CPT

## 2018-06-27 ENCOUNTER — APPOINTMENT (OUTPATIENT)
Dept: LAB | Facility: HOSPITAL | Age: 83
End: 2018-06-27
Attending: INTERNAL MEDICINE
Payer: MEDICARE

## 2018-06-27 DIAGNOSIS — N39.0 URINARY TRACT INFECTION, SITE NOT SPECIFIED: Primary | ICD-10-CM

## 2018-06-27 LAB
AMORPH CRY URNS QL MICRO: ABNORMAL
BACTERIA #/AREA URNS HPF: ABNORMAL /HPF
BILIRUB UR QL STRIP: NEGATIVE
CLARITY UR: ABNORMAL
COLOR UR: YELLOW
GLUCOSE UR QL STRIP: NEGATIVE
GRAN CASTS #/AREA URNS LPF: 2 /LPF
HGB UR QL STRIP: NEGATIVE
KETONES UR QL STRIP: ABNORMAL
LEUKOCYTE ESTERASE UR QL STRIP: ABNORMAL
MICROSCOPIC COMMENT: ABNORMAL
NITRITE UR QL STRIP: NEGATIVE
PH UR STRIP: 5 [PH] (ref 5–8)
PROT UR QL STRIP: ABNORMAL
RBC #/AREA URNS HPF: 4 /HPF (ref 0–4)
SP GR UR STRIP: 1.01 (ref 1–1.03)
SQUAMOUS #/AREA URNS HPF: 3 /HPF
URN SPEC COLLECT METH UR: ABNORMAL
UROBILINOGEN UR STRIP-ACNC: NEGATIVE EU/DL
WBC #/AREA URNS HPF: 15 /HPF (ref 0–5)
YEAST URNS QL MICRO: ABNORMAL

## 2018-06-27 PROCEDURE — 81000 URINALYSIS NONAUTO W/SCOPE: CPT

## 2018-06-28 ENCOUNTER — LAB VISIT (OUTPATIENT)
Dept: LAB | Facility: HOSPITAL | Age: 83
End: 2018-06-28
Attending: INTERNAL MEDICINE
Payer: MEDICARE

## 2018-06-28 DIAGNOSIS — R53.81 DEBILITY: ICD-10-CM

## 2018-06-28 DIAGNOSIS — E87.1 HYPOSMOLALITY SYNDROME: Primary | ICD-10-CM

## 2018-06-28 LAB
CORTIS SERPL-MCNC: 2.7 UG/DL
OSMOLALITY SERPL: 281 MOSM/KG

## 2018-06-28 PROCEDURE — 82533 TOTAL CORTISOL: CPT

## 2018-06-28 PROCEDURE — 36415 COLL VENOUS BLD VENIPUNCTURE: CPT

## 2018-06-28 PROCEDURE — 87086 URINE CULTURE/COLONY COUNT: CPT

## 2018-06-28 PROCEDURE — 87077 CULTURE AEROBIC IDENTIFY: CPT

## 2018-06-28 PROCEDURE — 87088 URINE BACTERIA CULTURE: CPT

## 2018-06-28 PROCEDURE — 87186 SC STD MICRODIL/AGAR DIL: CPT

## 2018-06-28 PROCEDURE — 83930 ASSAY OF BLOOD OSMOLALITY: CPT

## 2018-06-28 PROCEDURE — 87040 BLOOD CULTURE FOR BACTERIA: CPT

## 2018-06-29 LAB — BACTERIA UR CULT: NORMAL

## 2018-07-03 ENCOUNTER — LAB VISIT (OUTPATIENT)
Dept: LAB | Facility: HOSPITAL | Age: 83
End: 2018-07-03
Attending: INTERNAL MEDICINE
Payer: MEDICARE

## 2018-07-03 DIAGNOSIS — K56.0 PARALYTIC ILEUS: Primary | ICD-10-CM

## 2018-07-03 DIAGNOSIS — J18.9 UNRESOLVED PNEUMONIA: ICD-10-CM

## 2018-07-03 DIAGNOSIS — I25.10 CORONARY ATHEROSCLEROSIS OF NATIVE CORONARY ARTERY: ICD-10-CM

## 2018-07-03 LAB
ANION GAP SERPL CALC-SCNC: 10 MMOL/L
BACTERIA BLD CULT: NORMAL
BUN SERPL-MCNC: 30 MG/DL
CALCIUM SERPL-MCNC: 9.6 MG/DL
CHLORIDE SERPL-SCNC: 100 MMOL/L
CO2 SERPL-SCNC: 21 MMOL/L
CREAT SERPL-MCNC: 1.3 MG/DL
EST. GFR  (AFRICAN AMERICAN): 58.7 ML/MIN/1.73 M^2
EST. GFR  (NON AFRICAN AMERICAN): 50.8 ML/MIN/1.73 M^2
GLUCOSE SERPL-MCNC: 108 MG/DL
POTASSIUM SERPL-SCNC: 5 MMOL/L
SODIUM SERPL-SCNC: 131 MMOL/L

## 2018-07-03 PROCEDURE — 80048 BASIC METABOLIC PNL TOTAL CA: CPT

## 2018-07-03 PROCEDURE — 36415 COLL VENOUS BLD VENIPUNCTURE: CPT

## 2018-07-09 ENCOUNTER — LAB VISIT (OUTPATIENT)
Dept: LAB | Facility: HOSPITAL | Age: 83
End: 2018-07-09
Attending: NURSE PRACTITIONER
Payer: MEDICARE

## 2018-07-09 DIAGNOSIS — R42 DIZZINESS AND GIDDINESS: ICD-10-CM

## 2018-07-09 DIAGNOSIS — R13.10 DYSPHAGIA, IDIOPATHIC: ICD-10-CM

## 2018-07-09 DIAGNOSIS — R11.2 NAUSEA WITH VOMITING: ICD-10-CM

## 2018-07-09 DIAGNOSIS — E55.9 VITAMIN D DEFICIENCY DISEASE: ICD-10-CM

## 2018-07-09 DIAGNOSIS — R53.81 DEBILITY: ICD-10-CM

## 2018-07-09 DIAGNOSIS — R14.0 ABDOMINAL DISTENTION: ICD-10-CM

## 2018-07-09 DIAGNOSIS — R10.9 STOMACH DISCOMFORT: Primary | ICD-10-CM

## 2018-07-09 DIAGNOSIS — I25.2 OLD MYOCARDIAL INFARCTION: ICD-10-CM

## 2018-07-09 DIAGNOSIS — K56.0 PARALYTIC ILEUS: ICD-10-CM

## 2018-07-09 DIAGNOSIS — D51.0 VITAMIN B12 DEFICIENCY ANEMIA DUE TO INTRINSIC FACTOR DEFICIENCY: ICD-10-CM

## 2018-07-09 DIAGNOSIS — E11.9 DIABETES MELLITUS WITHOUT COMPLICATION: ICD-10-CM

## 2018-07-09 DIAGNOSIS — E78.5 HYPERLIPIDEMIA: ICD-10-CM

## 2018-07-09 LAB
ALBUMIN SERPL BCP-MCNC: 3.9 G/DL
ALP SERPL-CCNC: 33 U/L
ALT SERPL W/O P-5'-P-CCNC: 14 U/L
ANION GAP SERPL CALC-SCNC: 6 MMOL/L
AST SERPL-CCNC: 19 U/L
BASOPHILS # BLD AUTO: 0.02 K/UL
BASOPHILS NFR BLD: 0.2 %
BILIRUB SERPL-MCNC: 0.5 MG/DL
BUN SERPL-MCNC: 42 MG/DL
CALCIUM SERPL-MCNC: 9.1 MG/DL
CHLORIDE SERPL-SCNC: 103 MMOL/L
CO2 SERPL-SCNC: 19 MMOL/L
CREAT SERPL-MCNC: 1.9 MG/DL
DIFFERENTIAL METHOD: ABNORMAL
EOSINOPHIL # BLD AUTO: 0.1 K/UL
EOSINOPHIL NFR BLD: 0.9 %
ERYTHROCYTE [DISTWIDTH] IN BLOOD BY AUTOMATED COUNT: 14.7 %
EST. GFR  (AFRICAN AMERICAN): 37.1 ML/MIN/1.73 M^2
EST. GFR  (NON AFRICAN AMERICAN): 32.1 ML/MIN/1.73 M^2
GLUCOSE SERPL-MCNC: 203 MG/DL
HCT VFR BLD AUTO: 32.7 %
HGB BLD-MCNC: 11.1 G/DL
IMM GRANULOCYTES # BLD AUTO: 0.09 K/UL
IMM GRANULOCYTES NFR BLD AUTO: 0.9 %
IRON SERPL-MCNC: 109 UG/DL
LYMPHOCYTES # BLD AUTO: 1.9 K/UL
LYMPHOCYTES NFR BLD: 18.5 %
MAGNESIUM SERPL-MCNC: 2.4 MG/DL
MCH RBC QN AUTO: 29.5 PG
MCHC RBC AUTO-ENTMCNC: 33.9 G/DL
MCV RBC AUTO: 87 FL
MONOCYTES # BLD AUTO: 0.8 K/UL
MONOCYTES NFR BLD: 7.7 %
NEUTROPHILS # BLD AUTO: 7.2 K/UL
NEUTROPHILS NFR BLD: 71.8 %
NRBC BLD-RTO: 0 /100 WBC
PLATELET # BLD AUTO: 272 K/UL
PMV BLD AUTO: 9.2 FL
POTASSIUM SERPL-SCNC: 5.4 MMOL/L
PROT SERPL-MCNC: 6.8 G/DL
RBC # BLD AUTO: 3.76 M/UL
SODIUM SERPL-SCNC: 128 MMOL/L
WBC # BLD AUTO: 10.03 K/UL

## 2018-07-09 PROCEDURE — 36415 COLL VENOUS BLD VENIPUNCTURE: CPT

## 2018-07-09 PROCEDURE — 80053 COMPREHEN METABOLIC PANEL: CPT

## 2018-07-09 PROCEDURE — 83540 ASSAY OF IRON: CPT

## 2018-07-09 PROCEDURE — 83735 ASSAY OF MAGNESIUM: CPT

## 2018-07-09 PROCEDURE — 85025 COMPLETE CBC W/AUTO DIFF WBC: CPT

## 2018-07-09 PROCEDURE — 82607 VITAMIN B-12: CPT

## 2018-07-09 PROCEDURE — 82306 VITAMIN D 25 HYDROXY: CPT

## 2018-07-09 PROCEDURE — 82728 ASSAY OF FERRITIN: CPT

## 2018-07-10 ENCOUNTER — LAB VISIT (OUTPATIENT)
Dept: LAB | Facility: HOSPITAL | Age: 83
End: 2018-07-10
Attending: NURSE PRACTITIONER
Payer: MEDICARE

## 2018-07-10 DIAGNOSIS — E87.5 HIGH POTASSIUM: Primary | ICD-10-CM

## 2018-07-10 LAB
25(OH)D3+25(OH)D2 SERPL-MCNC: 39 NG/ML
ANION GAP SERPL CALC-SCNC: 10 MMOL/L
BUN SERPL-MCNC: 59 MG/DL
CALCIUM SERPL-MCNC: 9 MG/DL
CHLORIDE SERPL-SCNC: 99 MMOL/L
CO2 SERPL-SCNC: 18 MMOL/L
CREAT SERPL-MCNC: 2.3 MG/DL
EST. GFR  (AFRICAN AMERICAN): 29.5 ML/MIN/1.73 M^2
EST. GFR  (NON AFRICAN AMERICAN): 25.5 ML/MIN/1.73 M^2
FERRITIN SERPL-MCNC: 265 NG/ML
GLUCOSE SERPL-MCNC: 202 MG/DL
POTASSIUM SERPL-SCNC: 5.6 MMOL/L
SODIUM SERPL-SCNC: 127 MMOL/L
VIT B12 SERPL-MCNC: 585 PG/ML

## 2018-07-10 PROCEDURE — 36415 COLL VENOUS BLD VENIPUNCTURE: CPT

## 2018-07-10 PROCEDURE — 80048 BASIC METABOLIC PNL TOTAL CA: CPT

## 2018-07-18 ENCOUNTER — LAB VISIT (OUTPATIENT)
Dept: LAB | Facility: HOSPITAL | Age: 83
End: 2018-07-18
Attending: NURSE PRACTITIONER
Payer: MEDICARE

## 2018-07-18 DIAGNOSIS — N39.0 URINARY TRACT INFECTION, SITE NOT SPECIFIED: ICD-10-CM

## 2018-07-18 DIAGNOSIS — E78.5 HYPERLIPIDEMIA: Primary | ICD-10-CM

## 2018-07-18 LAB
ALBUMIN SERPL BCP-MCNC: 4 G/DL
ALP SERPL-CCNC: 32 U/L
ALT SERPL W/O P-5'-P-CCNC: 15 U/L
ANION GAP SERPL CALC-SCNC: 10 MMOL/L
AST SERPL-CCNC: 26 U/L
BACTERIA #/AREA URNS HPF: ABNORMAL /HPF
BASOPHILS # BLD AUTO: 0.01 K/UL
BASOPHILS NFR BLD: 0.1 %
BILIRUB SERPL-MCNC: 0.5 MG/DL
BILIRUB UR QL STRIP: NEGATIVE
BUN SERPL-MCNC: 41 MG/DL
CALCIUM SERPL-MCNC: 9.3 MG/DL
CHLORIDE SERPL-SCNC: 101 MMOL/L
CLARITY UR: ABNORMAL
CO2 SERPL-SCNC: 18 MMOL/L
COLOR UR: YELLOW
CREAT SERPL-MCNC: 1.9 MG/DL
DIFFERENTIAL METHOD: ABNORMAL
EOSINOPHIL # BLD AUTO: 0.1 K/UL
EOSINOPHIL NFR BLD: 1.1 %
ERYTHROCYTE [DISTWIDTH] IN BLOOD BY AUTOMATED COUNT: 14.4 %
EST. GFR  (AFRICAN AMERICAN): 37.1 ML/MIN/1.73 M^2
EST. GFR  (NON AFRICAN AMERICAN): 32.1 ML/MIN/1.73 M^2
GLUCOSE SERPL-MCNC: 178 MG/DL
GLUCOSE UR QL STRIP: NEGATIVE
HCT VFR BLD AUTO: 32.6 %
HGB BLD-MCNC: 11.1 G/DL
HGB UR QL STRIP: ABNORMAL
HYALINE CASTS #/AREA URNS LPF: 3 /LPF
IMM GRANULOCYTES # BLD AUTO: 0.15 K/UL
IMM GRANULOCYTES NFR BLD AUTO: 1.2 %
KETONES UR QL STRIP: NEGATIVE
LEUKOCYTE ESTERASE UR QL STRIP: ABNORMAL
LYMPHOCYTES # BLD AUTO: 2 K/UL
LYMPHOCYTES NFR BLD: 16.3 %
MCH RBC QN AUTO: 29.5 PG
MCHC RBC AUTO-ENTMCNC: 34 G/DL
MCV RBC AUTO: 87 FL
MICROSCOPIC COMMENT: ABNORMAL
MONOCYTES # BLD AUTO: 0.7 K/UL
MONOCYTES NFR BLD: 5.9 %
NEUTROPHILS # BLD AUTO: 9.2 K/UL
NEUTROPHILS NFR BLD: 75.4 %
NITRITE UR QL STRIP: NEGATIVE
NRBC BLD-RTO: 0 /100 WBC
PH UR STRIP: 5 [PH] (ref 5–8)
PLATELET # BLD AUTO: 270 K/UL
PMV BLD AUTO: 8.6 FL
POTASSIUM SERPL-SCNC: 5.9 MMOL/L
PROT SERPL-MCNC: 7.4 G/DL
PROT UR QL STRIP: ABNORMAL
RBC # BLD AUTO: 3.76 M/UL
RBC #/AREA URNS HPF: 30 /HPF (ref 0–4)
SODIUM SERPL-SCNC: 129 MMOL/L
SP GR UR STRIP: 1.02 (ref 1–1.03)
URN SPEC COLLECT METH UR: ABNORMAL
UROBILINOGEN UR STRIP-ACNC: NEGATIVE EU/DL
WBC # BLD AUTO: 12.24 K/UL
WBC #/AREA URNS HPF: 18 /HPF (ref 0–5)
YEAST URNS QL MICRO: ABNORMAL

## 2018-07-18 PROCEDURE — 87088 URINE BACTERIA CULTURE: CPT

## 2018-07-18 PROCEDURE — 85025 COMPLETE CBC W/AUTO DIFF WBC: CPT

## 2018-07-18 PROCEDURE — 80053 COMPREHEN METABOLIC PANEL: CPT

## 2018-07-18 PROCEDURE — 81000 URINALYSIS NONAUTO W/SCOPE: CPT

## 2018-07-18 PROCEDURE — 36415 COLL VENOUS BLD VENIPUNCTURE: CPT

## 2018-07-18 PROCEDURE — 87086 URINE CULTURE/COLONY COUNT: CPT

## 2018-07-18 PROCEDURE — 87106 FUNGI IDENTIFICATION YEAST: CPT

## 2018-07-20 LAB — BACTERIA UR CULT: NORMAL

## 2018-08-20 ENCOUNTER — PATIENT MESSAGE (OUTPATIENT)
Dept: CARDIOLOGY | Facility: CLINIC | Age: 83
End: 2018-08-20

## 2018-10-03 ENCOUNTER — PATIENT MESSAGE (OUTPATIENT)
Dept: CARDIOLOGY | Facility: CLINIC | Age: 83
End: 2018-10-03